# Patient Record
Sex: FEMALE | Race: BLACK OR AFRICAN AMERICAN | NOT HISPANIC OR LATINO | Employment: UNEMPLOYED | URBAN - METROPOLITAN AREA
[De-identification: names, ages, dates, MRNs, and addresses within clinical notes are randomized per-mention and may not be internally consistent; named-entity substitution may affect disease eponyms.]

---

## 2021-05-29 ENCOUNTER — RECORDS - HEALTHEAST (OUTPATIENT)
Dept: ADMINISTRATIVE | Facility: CLINIC | Age: 61
End: 2021-05-29

## 2022-05-11 ENCOUNTER — MEDICAL CORRESPONDENCE (OUTPATIENT)
Dept: HEALTH INFORMATION MANAGEMENT | Facility: CLINIC | Age: 62
End: 2022-05-11
Payer: COMMERCIAL

## 2022-05-16 ENCOUNTER — TRANSCRIBE ORDERS (OUTPATIENT)
Dept: OTHER | Age: 62
End: 2022-05-16
Payer: COMMERCIAL

## 2022-05-16 DIAGNOSIS — M87.9 BILATERAL HIP OSTEONECROSIS (H): Primary | ICD-10-CM

## 2022-05-19 NOTE — TELEPHONE ENCOUNTER
Action    Action Taken 5/19/2022 8:16AM CROW     I pulled Nahun[facility]ldon's Records into CE       DIAGNOSIS: bilateral hip   APPOINTMENT DATE: 6/6/2022   NOTES STATUS DETAILS   OFFICE NOTE from referring provider Care Everywhere Dr. Jacob Ocasio MD at [facility].   OFFICE NOTE from other specialist Care Everywhere    MEDICATION LIST Internal    LABS     CBC/DIFF Care Everywhere EPIC   MRI Care Everywhere MRI Pelvis 12/2/2021   CT SCAN Care Everywhere CT Angio Chest abdomen Pelvis 11/18/2021    More in PACS   XRAYS (IMAGES & REPORTS) Care Everywhere Xray Hip 5/11/2022    Xray Pelvis 4/27/2022    Xray Pelvis 1/13/2022    More in PACS   TUMOR

## 2022-06-06 ENCOUNTER — PRE VISIT (OUTPATIENT)
Dept: ORTHOPEDICS | Facility: CLINIC | Age: 62
End: 2022-06-06

## 2022-06-06 ENCOUNTER — ANCILLARY PROCEDURE (OUTPATIENT)
Dept: GENERAL RADIOLOGY | Facility: CLINIC | Age: 62
End: 2022-06-06
Attending: ORTHOPAEDIC SURGERY
Payer: COMMERCIAL

## 2022-06-06 ENCOUNTER — OFFICE VISIT (OUTPATIENT)
Dept: ORTHOPEDICS | Facility: CLINIC | Age: 62
End: 2022-06-06
Payer: COMMERCIAL

## 2022-06-06 VITALS — WEIGHT: 204 LBS | BODY MASS INDEX: 30.92 KG/M2 | HEIGHT: 68 IN

## 2022-06-06 DIAGNOSIS — M25.552 BILATERAL HIP PAIN: ICD-10-CM

## 2022-06-06 DIAGNOSIS — M25.552 BILATERAL HIP PAIN: Primary | ICD-10-CM

## 2022-06-06 DIAGNOSIS — M25.551 BILATERAL HIP PAIN: ICD-10-CM

## 2022-06-06 DIAGNOSIS — M87.9 BILATERAL HIP OSTEONECROSIS (H): ICD-10-CM

## 2022-06-06 DIAGNOSIS — M25.551 BILATERAL HIP PAIN: Primary | ICD-10-CM

## 2022-06-06 PROCEDURE — 99204 OFFICE O/P NEW MOD 45 MIN: CPT | Performed by: ORTHOPAEDIC SURGERY

## 2022-06-06 PROCEDURE — 73523 X-RAY EXAM HIPS BI 5/> VIEWS: CPT | Performed by: RADIOLOGY

## 2022-06-06 RX ORDER — DOXAZOSIN 2 MG/1
2 TABLET ORAL
COMMUNITY
End: 2022-09-22

## 2022-06-06 RX ORDER — SERTRALINE HYDROCHLORIDE 25 MG/1
25 TABLET, FILM COATED ORAL
COMMUNITY
End: 2022-09-22

## 2022-06-06 RX ORDER — PANTOPRAZOLE SODIUM 40 MG/1
40 TABLET, DELAYED RELEASE ORAL
COMMUNITY
Start: 2022-01-20 | End: 2022-09-22

## 2022-06-06 RX ORDER — FLUTICASONE PROPIONATE 50 MCG
2 SPRAY, SUSPENSION (ML) NASAL
COMMUNITY
Start: 2021-10-27 | End: 2023-02-02

## 2022-06-06 RX ORDER — NITROGLYCERIN 0.4 MG/1
0.4 TABLET SUBLINGUAL
COMMUNITY
Start: 2021-06-20

## 2022-06-06 RX ORDER — CEFUROXIME AXETIL 250 MG/1
250 TABLET ORAL
COMMUNITY
End: 2022-09-22

## 2022-06-06 RX ORDER — DILTIAZEM HYDROCHLORIDE 180 MG/1
360 CAPSULE, EXTENDED RELEASE ORAL
COMMUNITY
Start: 2022-01-20 | End: 2022-09-22 | Stop reason: ALTCHOICE

## 2022-06-06 RX ORDER — LORAZEPAM 0.5 MG/1
0.5 TABLET ORAL
COMMUNITY
End: 2022-09-22 | Stop reason: ALTCHOICE

## 2022-06-06 RX ORDER — BACLOFEN 10 MG/1
TABLET ORAL
COMMUNITY
Start: 2022-05-02 | End: 2022-09-22

## 2022-06-06 RX ORDER — ACETAMINOPHEN 500 MG
TABLET ORAL
COMMUNITY
Start: 2021-11-11 | End: 2022-11-11

## 2022-06-06 RX ORDER — BUPRENORPHINE 5 UG/H
1 PATCH TRANSDERMAL
COMMUNITY
End: 2022-09-22 | Stop reason: ALTCHOICE

## 2022-06-06 RX ORDER — ONDANSETRON 4 MG/1
4 TABLET, ORALLY DISINTEGRATING ORAL
COMMUNITY
End: 2023-02-02

## 2022-06-06 RX ORDER — AZITHROMYCIN 250 MG/1
250 TABLET, FILM COATED ORAL
COMMUNITY
End: 2022-09-22

## 2022-06-06 RX ORDER — OXYCODONE AND ACETAMINOPHEN 5; 325 MG/1; MG/1
TABLET ORAL
COMMUNITY
Start: 2022-05-19 | End: 2022-09-22 | Stop reason: ALTCHOICE

## 2022-06-06 RX ORDER — CLONIDINE HYDROCHLORIDE 0.3 MG/1
0.3 TABLET ORAL
COMMUNITY
Start: 2022-04-19 | End: 2022-09-22

## 2022-06-06 RX ORDER — PROCHLORPERAZINE MALEATE 10 MG
10 TABLET ORAL
COMMUNITY
Start: 2021-11-19 | End: 2023-02-02

## 2022-06-06 RX ORDER — CEPHALEXIN 500 MG/1
500 CAPSULE ORAL 2 TIMES DAILY
COMMUNITY
Start: 2021-07-22 | End: 2022-09-22

## 2022-06-06 RX ORDER — LACTULOSE 10 G/15ML
SOLUTION ORAL
COMMUNITY
Start: 2021-11-02 | End: 2022-09-22

## 2022-06-06 RX ORDER — TRAMADOL HYDROCHLORIDE 50 MG/1
50 TABLET ORAL
COMMUNITY
Start: 2022-02-17 | End: 2022-09-22 | Stop reason: ALTCHOICE

## 2022-06-06 RX ORDER — PREDNISONE 10 MG/1
10 TABLET ORAL
COMMUNITY
End: 2022-09-22

## 2022-06-06 RX ORDER — GABAPENTIN 100 MG/1
100 CAPSULE ORAL
COMMUNITY
Start: 2022-04-12 | End: 2022-09-22

## 2022-06-06 RX ORDER — PERMETHRIN 50 MG/G
CREAM TOPICAL
COMMUNITY
Start: 2021-07-22 | End: 2023-02-02

## 2022-06-06 ASSESSMENT — ACTIVITIES OF DAILY LIVING (ADL)
ADL_SUBSCALE_SCORE: 26.47
ADL_MEAN: 2.94
ADL_SUM: 50

## 2022-06-06 ASSESSMENT — HOOS S4: HOW SEVERE IS YOUR HIP JOINT STIFFNESS AFTER FIRST WAKENING IN THE MORNING?: SEVERE

## 2022-06-06 NOTE — NURSING NOTE
"Chief Complaint   Patient presents with     Consult     Right hip pain onset about a year and a half ago, said [facility]. 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  APPENDECTOMY   2.  6/24/2005 av fistula anastamosis Left   3. 11/7/2005 AV FISTULA PLACEMENT Right   thrombosed   4. 07/2019 dialysis fistula creation Left - [facility]   5.  INCISION AND DRAINAGE 06/2008 vulva or perineal abscess   6.  IRRIGATION AND DEBRIDEMENT 06/2008 ischiorectal and perirectal abscess   7. 07/10/2019 - LEFT ARM ARTERIAL VENOUS FISTULA FROM RADIAL ARTERY TO AXILLARY VEIN  8. 3/4/2020 - PLACEMENT OF RIGHT DIALYSIS ACCESS/ARTERIAL VENOUS FISTULA - Dr. Coon   9.  UNLISTED PROCEDURE SPINE 02/2006   10.  02/05/2020 - REVISION LEFT DIALYSIS ACCESS - ARTERIAL VENOUS FISTULA Left         Pain Assessment  Patient Currently in Pain: Yes  0-10 Pain Scale: 7 (patient states increased pain during dialysis)  Primary Pain Location: Hip           [id number] - [place], [place] - [address] AT [address]  [facility] (ESRD BUNDLE ONLY) - [address]         Allergies   Allergen Reactions     Nortriptyline Other (See Comments)     Psychosis  Psychosis       Beta Adrenergic Blockers Hives     Other reaction(s): Wheezing     Metoprolol Unknown     Morphine Hives and Nausea     Tolerates Percocet     Codeine Unknown           Current Outpatient Medications   Medication     acetaminophen (TYLENOL) 500 MG tablet     albuterol (PROVENTIL) 2.5 mg /3 mL (0.083 %) nebulizer solution     B complex with C#20-folic acid (NEPHROCAPS) 1 mg cap capsule     " calcium acetate (PHOSLO) 667 mg capsule     cephALEXin (KEFLEX) 500 MG capsule     cholecalciferol, vitamin D3, 1,000 unit tablet     cinacalcet (SENSIPAR) 90 MG tablet     cloNIDine (CATAPRES) 0.3 MG tablet     diltiazem (CARDIZEM CD) 360 MG 24 hr capsule     doxazosin (CARDURA) 2 MG tablet     fluticasone (FLONASE) 50 MCG/ACT nasal spray     gabapentin (NEURONTIN) 100 MG capsule     lactulose (CHRONULAC) 10 GM/15ML solution     LORazepam (ATIVAN) 0.5 MG tablet     losartan (COZAAR) 50 MG tablet     nitroGLYcerin (NITROSTAT) 0.4 MG sublingual tablet     ondansetron (ZOFRAN ODT) 4 MG ODT tab     oxyCODONE-acetaminophen (PERCOCET) 5-325 MG tablet     pantoprazole (PROTONIX) 40 MG EC tablet     permethrin (ELIMITE) 5 % external cream     predniSONE (DELTASONE) 10 MG tablet     prochlorperazine (COMPAZINE) 10 MG tablet     sertraline (ZOLOFT) 25 MG tablet     sevelamer (RENVELA) 2.4 gram PwPk powder     tiotropium (SPIRIVA) 18 mcg inhalation capsule     traMADol (ULTRAM) 50 MG tablet     acetaminophen (TYLENOL) 325 MG tablet     aspirin 81 MG EC tablet     azithromycin (ZITHROMAX) 250 MG tablet     baclofen (LIORESAL) 10 MG tablet     buprenorphine (BUTRANS) 5 MCG/HR WK patch     cefuroxime (CEFTIN) 250 MG tablet     cloNIDine HCl (CATAPRES) 0.3 MG tablet     diltiazem ER (DILT-XR) 180 MG 24 hr capsule     Dimethicone 1.5 % CREA     doxazosin (CARDURA) 2 MG tablet     fluticasone-salmeterol (ADVAIR) 100-50 mcg/dose DISKUS     LORazepam (ATIVAN) 0.5 MG tablet     minoxidil (LONITEN) 2.5 MG tablet     omeprazole (PRILOSEC) 20 MG capsule     polyethylene glycol (MIRALAX) 17 gram packet     No current facility-administered medications for this visit.             Questionnaires:    HOOS Hip Dysfunction & Osteoarthritis Outcome Questionnaire    No flowsheet data found.           KOOS Knee Survey Assessment    No flowsheet data found.           Promis 10 Assessment    No flowsheet data found.           Ortho [place] Knee  Questionnaire    No flowsheet data found.

## 2022-06-06 NOTE — LETTER
6/6/2022         RE: Rufina Urrutia  [address]  [place] [address]        Dear Colleague,    Thank you for referring your patient, Rufina Urrutia, to the [facility] [place]. Please see a copy of my visit note below.        [facility]  Orthopaedic Surgery, Joint Replacement Consultation  by Zac Salazar M.D.    Rufina Urrutia MRN# [id number]    YOB: 1960     Requesting physician: No ref. provider found  Lawrence Ortega  Nephrology  Jonny Erwin, [facility]  Benjie Garcia            Assessment and Plan:   Assessment:  1. Osteonecrosis bilateral femoral heads with remote history of ethanol abuse  2. Chronic renal disease on hemodialysis     Plan:  1. Obtain MRI examination of both hips or pelvis from [facility] as it was not available for review with me today.  Any prior MRI examinations of the abdomen pelvis would be useful for comparison and should be obtained as well.  2. As there is some concern regarding the etiology of her pain and whether or not it is related to her femoral head osteonecrosis, we will arrange for CT scan of both femoral heads to evaluate for possible occult subchondral fracture.  In the absence of such it is questionable whether or not the femoral head osteonecrosis is a source of her symptoms.  3. Return for follow-up examination of the above is completed.           History of Present Illness:   61 year old female  chief complaint    This patient is seen for third opinion in regards to her osteonecrosis of bilateral hips.  Patient states she has been seen at [facility] by Dr. Jonny Erwin and also Dr. Willie Ocasio at [facility] for evaluation of her hips.  She is having bilateral hip pain, greater on the right side.  She notes the pain mostly in the posterior lateral area of her buttock region.    She states that she was diagnosed with osteonecrosis.  Etiology is uncertain.  Patient states that she has  "a history of ethanol abuse over 30 years ago but has been sober for the last 30 years.  She acknowledges undergoing chemical dependency program for crack and narcotic usage but none for ethanol usage.    She states that she also has chronic renal disease and has been prescribed 1 week courses of prednisone on 2-3 occasions over   the last year.  No other steroid exposure systemically.  Patient has had numerous steroid injections around her hip.    Dr. Erwin did not prescribe any surgical intervention and Dr. Ocasio referred her for evaluation with me.    Of note is the patient's history of chronic renal disease.      Current symptoms:  Problem: Osteonecrosis of Bilateral Hips   Onset and duration: year and a half ago   Awakens from sleep due to sx's:  Yes  Precipitating Injury:  No    Other joints or sites painful:  Yes             Physical Exam:     EXAMINATION pertinent findings:   PSYCH: Pleasant, healthy-appearing, alert, oriented x3, cooperative. Normal mood and affect.  VITAL SIGNS: Height 1.721 m (5' 7.75\"), weight 92.5 kg (204 lb)..  Reviewed nursing intake notes.   Body mass index is 31.25 kg/m .  RESP: non labored breathing   ABD: benign, soft, non-tender, no acute peritoneal findings  SKIN: grossly normal   LYMPHATIC: grossly normal, no adenopathy, no extremity edema  NEURO: grossly normal , no motor deficits  VASCULAR: satisfactory perfusion of all extremities   MUSCULOSKELETAL:   Antalgic gait bilaterally.  Unable to stand erect.  Would not lie down supine on exam table for me today.  Could only examine her in a wheelchair.  Hip flexion to 90 degrees bilaterally possible.  Internal rotation of 20 external Tatian of 45 degrees bilaterally.  Full range of knee motion bilaterally.                 Data:   All laboratory data reviewed  All imaging studies reviewed by me    Plain radiographs are available for review.  Some density in both femoral head superiorly but no evidence of subchondral fracture is " identified.  Radiographs are difficult to evaluate due to soft tissue density overlying the femoral head.    MRI examination unavailable for review.      DATA for DOCUMENTATION:         Past Medical History:     Patient Active Problem List   Diagnosis     Encephalopathy acute     Acute encephalopathy     Anemia of renal disease     Arteriosclerosis of coronary artery     End stage kidney disease (H)     Accelerated hypertension     Asthma, moderate persistent     Extreme obesity     No past medical history on file.    Also see scanned health assessment forms.       Past Surgical History:   No past surgical history on file.         Social History:     Social History     Socioeconomic History     Marital status:      Spouse name: Not on file     Number of children: Not on file     Years of education: Not on file     Highest education level: Not on file   Occupational History     Not on file   Tobacco Use     Smoking status: Never Smoker     Smokeless tobacco: Not on file   Substance and Sexual Activity     Alcohol use: No     Drug use: Not on file     Sexual activity: Not on file   Other Topics Concern     Not on file   Social History Narrative    Lives with family.      Social Determinants of Health     Financial Resource Strain: Not on file   Food Insecurity: Not on file   Transportation Needs: Not on file   Physical Activity: Not on file   Stress: Not on file   Social Connections: Not on file   Intimate Partner Violence: Not on file   Housing Stability: Not on file            Family History:       Family History   Problem Relation Age of Onset     Hypertension Mother      Kidney Disease Mother      Cancer Brother      Breast Cancer Maternal Aunt             Medications:     Current Outpatient Medications   Medication Sig     acetaminophen (TYLENOL) 325 MG tablet [ACETAMINOPHEN (TYLENOL) 325 MG TABLET] Take 325-650 mg by mouth every 4 (four) hours as needed.      albuterol (PROVENTIL) 2.5 mg /3 mL (0.083 %)  nebulizer solution [ALBUTEROL (PROVENTIL) 2.5 MG /3 ML (0.083 %) NEBULIZER SOLUTION] Inhale 2.5 mg every 6 (six) hours as needed.      aspirin 81 MG EC tablet [ASPIRIN 81 MG EC TABLET] Take 81 mg by mouth daily.      B complex with C#20-folic acid (NEPHROCAPS) 1 mg cap capsule [B COMPLEX WITH C#20-FOLIC ACID (NEPHROCAPS) 1 MG CAP CAPSULE] TAKE 1 BY MOUTH DAILY     calcium acetate (PHOSLO) 667 mg capsule [CALCIUM ACETATE (PHOSLO) 667 MG CAPSULE] TAKE 2 BY MOUTH 3 TIMES    DAILY WITH MEALS           (GENERIC FOR PHOSLO)     cholecalciferol, vitamin D3, 1,000 unit tablet [CHOLECALCIFEROL, VITAMIN D3, 1,000 UNIT TABLET] Take 1,000 Units by mouth daily.     cinacalcet (SENSIPAR) 90 MG tablet [CINACALCET (SENSIPAR) 90 MG TABLET] Take 90 mg by mouth daily.     cloNIDine HCl (CATAPRES) 0.3 MG tablet [CLONIDINE HCL (CATAPRES) 0.3 MG TABLET] Take 0.3 mg by mouth 3 (three) times a day.      diltiazem (CARDIZEM CD) 360 MG 24 hr capsule [DILTIAZEM (CARDIZEM CD) 360 MG 24 HR CAPSULE] TAKE 1 BY MOUTH DAILY     doxazosin (CARDURA) 2 MG tablet [DOXAZOSIN (CARDURA) 2 MG TABLET] Take 2 mg by mouth 2 (two) times a day.      fluticasone-salmeterol (ADVAIR) 100-50 mcg/dose DISKUS [FLUTICASONE-SALMETEROL (ADVAIR) 100-50 MCG/DOSE DISKUS] Inhale 1 puff 2 (two) times a day.     LORazepam (ATIVAN) 0.5 MG tablet [LORAZEPAM (ATIVAN) 0.5 MG TABLET] Take 0.5 mg by mouth bedtime as needed for anxiety.     losartan (COZAAR) 50 MG tablet [LOSARTAN (COZAAR) 50 MG TABLET] Take 50 mg by mouth 2 (two) times a day.      minoxidil (LONITEN) 2.5 MG tablet [MINOXIDIL (LONITEN) 2.5 MG TABLET] Take 5 mg by mouth 2 (two) times a day.      omeprazole (PRILOSEC) 20 MG capsule [OMEPRAZOLE (PRILOSEC) 20 MG CAPSULE] Take 40 mg by mouth daily.      polyethylene glycol (MIRALAX) 17 gram packet [POLYETHYLENE GLYCOL (MIRALAX) 17 GRAM PACKET] Take 1 packet by mouth 2 (two) times a day.      sevelamer (RENVELA) 2.4 gram PwPk powder [SEVELAMER (RENVELA) 2.4 GRAM PWPK  POWDER] MIX AND TAKE 1 PACKET BY   MOUTH TWO TIMES DAILY WITH MEALS     tiotropium (SPIRIVA) 18 mcg inhalation capsule [TIOTROPIUM (SPIRIVA) 18 MCG INHALATION CAPSULE] Place 18 mcg into inhaler and inhale daily.      No current facility-administered medications for this visit.              Review of Systems:   A comprehensive 10 point review of systems (constitutional, ENT, cardiac, peripheral vascular, lymphatic, respiratory, GI, , Musculoskeletal, skin, Neurological) was performed and found to be negative except as described in this note.       HOOS Hip Dysfunction & Osteoarthritis Outcome Questionnaire    No flowsheet data found.           [unfilled]    KOOS Knee Survey Assessment    No flowsheet data found.     Promis 10 Assessment    No flowsheet data found.       Ortho [place] Knee Questionnaire    No flowsheet data found.       See intake form completed by patient

## 2022-06-06 NOTE — PROGRESS NOTES
[facility]  Orthopaedic Surgery, Joint Replacement Consultation  by Zac Salazar M.D.    Rufina Urrutia MRN# [id number]    YOB: 1960     Requesting physician: No ref. provider found  Lawrence Ortega.  Nephrology  Jonny Erwin, [facility]  Benjie Garcia            Assessment and Plan:   Assessment:  1. Osteonecrosis bilateral femoral heads with remote history of ethanol abuse  2. Chronic renal disease on hemodialysis     Plan:  1. Obtain MRI examination of both hips or pelvis from [facility] as it was not available for review with me today.  Any prior MRI examinations of the abdomen pelvis would be useful for comparison and should be obtained as well.  2. As there is some concern regarding the etiology of her pain and whether or not it is related to her femoral head osteonecrosis, we will arrange for CT scan of both femoral heads to evaluate for possible occult subchondral fracture.  In the absence of such it is questionable whether or not the femoral head osteonecrosis is a source of her symptoms.  3. Return for follow-up examination of the above is completed.           History of Present Illness:   61 year old female  chief complaint    This patient is seen for third opinion in regards to her osteonecrosis of bilateral hips.  Patient states she has been seen at [facility] by Dr. Jonny Erwin and also Dr. Willie Ocasio at [facility] for evaluation of her hips.  She is having bilateral hip pain, greater on the right side.  She notes the pain mostly in the posterior lateral area of her buttock region.    She states that she was diagnosed with osteonecrosis.  Etiology is uncertain.  Patient states that she has a history of ethanol abuse over 30 years ago but has been sober for the last 30 years.  She acknowledges undergoing chemical dependency program for crack and narcotic usage but none for ethanol usage.    She states that she also has chronic renal disease and has  "been prescribed 1 week courses of prednisone on 2-3 occasions over   the last year.  No other steroid exposure systemically.  Patient has had numerous steroid injections around her hip.    Dr. Erwin did not prescribe any surgical intervention and Dr. Ocasio referred her for evaluation with me.    Of note is the patient's history of chronic renal disease.      Current symptoms:  Problem: Osteonecrosis of Bilateral Hips   Onset and duration: year and a half ago   Awakens from sleep due to sx's:  Yes  Precipitating Injury:  No    Other joints or sites painful:  Yes             Physical Exam:     EXAMINATION pertinent findings:   PSYCH: Pleasant, healthy-appearing, alert, oriented x3, cooperative. Normal mood and affect.  VITAL SIGNS: Height 1.721 m (5' 7.75\"), weight 92.5 kg (204 lb)..  Reviewed nursing intake notes.   Body mass index is 31.25 kg/m .  RESP: non labored breathing   ABD: benign, soft, non-tender, no acute peritoneal findings  SKIN: grossly normal   LYMPHATIC: grossly normal, no adenopathy, no extremity edema  NEURO: grossly normal , no motor deficits  VASCULAR: satisfactory perfusion of all extremities   MUSCULOSKELETAL:   Antalgic gait bilaterally.  Unable to stand erect.  Would not lie down supine on exam table for me today.  Could only examine her in a wheelchair.  Hip flexion to 90 degrees bilaterally possible.  Internal rotation of 20 external Tatian of 45 degrees bilaterally.  Full range of knee motion bilaterally.                 Data:   All laboratory data reviewed  All imaging studies reviewed by me    Plain radiographs are available for review.  Some density in both femoral head superiorly but no evidence of subchondral fracture is identified.  Radiographs are difficult to evaluate due to soft tissue density overlying the femoral head.    MRI examination unavailable for review.      DATA for DOCUMENTATION:         Past Medical History:     Patient Active Problem List   Diagnosis     " Encephalopathy acute     Acute encephalopathy     Anemia of renal disease     Arteriosclerosis of coronary artery     End stage kidney disease (H)     Accelerated hypertension     Asthma, moderate persistent     Extreme obesity     No past medical history on file.    Also see scanned health assessment forms.       Past Surgical History:   No past surgical history on file.         Social History:     Social History     Socioeconomic History     Marital status:      Spouse name: Not on file     Number of children: Not on file     Years of education: Not on file     Highest education level: Not on file   Occupational History     Not on file   Tobacco Use     Smoking status: Never Smoker     Smokeless tobacco: Not on file   Substance and Sexual Activity     Alcohol use: No     Drug use: Not on file     Sexual activity: Not on file   Other Topics Concern     Not on file   Social History Narrative    Lives with family.      Social Determinants of Health     Financial Resource Strain: Not on file   Food Insecurity: Not on file   Transportation Needs: Not on file   Physical Activity: Not on file   Stress: Not on file   Social Connections: Not on file   Intimate Partner Violence: Not on file   Housing Stability: Not on file            Family History:       Family History   Problem Relation Age of Onset     Hypertension Mother      Kidney Disease Mother      Cancer Brother      Breast Cancer Maternal Aunt             Medications:     Current Outpatient Medications   Medication Sig     acetaminophen (TYLENOL) 325 MG tablet [ACETAMINOPHEN (TYLENOL) 325 MG TABLET] Take 325-650 mg by mouth every 4 (four) hours as needed.      albuterol (PROVENTIL) 2.5 mg /3 mL (0.083 %) nebulizer solution [ALBUTEROL (PROVENTIL) 2.5 MG /3 ML (0.083 %) NEBULIZER SOLUTION] Inhale 2.5 mg every 6 (six) hours as needed.      aspirin 81 MG EC tablet [ASPIRIN 81 MG EC TABLET] Take 81 mg by mouth daily.      B complex with C#20-folic acid  (NEPHROCAPS) 1 mg cap capsule [B COMPLEX WITH C#20-FOLIC ACID (NEPHROCAPS) 1 MG CAP CAPSULE] TAKE 1 BY MOUTH DAILY     calcium acetate (PHOSLO) 667 mg capsule [CALCIUM ACETATE (PHOSLO) 667 MG CAPSULE] TAKE 2 BY MOUTH 3 TIMES    DAILY WITH MEALS           (GENERIC FOR PHOSLO)     cholecalciferol, vitamin D3, 1,000 unit tablet [CHOLECALCIFEROL, VITAMIN D3, 1,000 UNIT TABLET] Take 1,000 Units by mouth daily.     cinacalcet (SENSIPAR) 90 MG tablet [CINACALCET (SENSIPAR) 90 MG TABLET] Take 90 mg by mouth daily.     cloNIDine HCl (CATAPRES) 0.3 MG tablet [CLONIDINE HCL (CATAPRES) 0.3 MG TABLET] Take 0.3 mg by mouth 3 (three) times a day.      diltiazem (CARDIZEM CD) 360 MG 24 hr capsule [DILTIAZEM (CARDIZEM CD) 360 MG 24 HR CAPSULE] TAKE 1 BY MOUTH DAILY     doxazosin (CARDURA) 2 MG tablet [DOXAZOSIN (CARDURA) 2 MG TABLET] Take 2 mg by mouth 2 (two) times a day.      fluticasone-salmeterol (ADVAIR) 100-50 mcg/dose DISKUS [FLUTICASONE-SALMETEROL (ADVAIR) 100-50 MCG/DOSE DISKUS] Inhale 1 puff 2 (two) times a day.     LORazepam (ATIVAN) 0.5 MG tablet [LORAZEPAM (ATIVAN) 0.5 MG TABLET] Take 0.5 mg by mouth bedtime as needed for anxiety.     losartan (COZAAR) 50 MG tablet [LOSARTAN (COZAAR) 50 MG TABLET] Take 50 mg by mouth 2 (two) times a day.      minoxidil (LONITEN) 2.5 MG tablet [MINOXIDIL (LONITEN) 2.5 MG TABLET] Take 5 mg by mouth 2 (two) times a day.      omeprazole (PRILOSEC) 20 MG capsule [OMEPRAZOLE (PRILOSEC) 20 MG CAPSULE] Take 40 mg by mouth daily.      polyethylene glycol (MIRALAX) 17 gram packet [POLYETHYLENE GLYCOL (MIRALAX) 17 GRAM PACKET] Take 1 packet by mouth 2 (two) times a day.      sevelamer (RENVELA) 2.4 gram PwPk powder [SEVELAMER (RENVELA) 2.4 GRAM PWPK POWDER] MIX AND TAKE 1 PACKET BY   MOUTH TWO TIMES DAILY WITH MEALS     tiotropium (SPIRIVA) 18 mcg inhalation capsule [TIOTROPIUM (SPIRIVA) 18 MCG INHALATION CAPSULE] Place 18 mcg into inhaler and inhale daily.      No current facility-administered  medications for this visit.              Review of Systems:   A comprehensive 10 point review of systems (constitutional, ENT, cardiac, peripheral vascular, lymphatic, respiratory, GI, , Musculoskeletal, skin, Neurological) was performed and found to be negative except as described in this note.       HOOS Hip Dysfunction & Osteoarthritis Outcome Questionnaire    No flowsheet data found.           [unfilled]    KOOS Knee Survey Assessment    No flowsheet data found.           Promis 10 Assessment    No flowsheet data found.           Ortho [place] Knee Questionnaire    No flowsheet data found.             See intake form completed by patient

## 2022-06-20 ENCOUNTER — ANCILLARY PROCEDURE (OUTPATIENT)
Dept: CT IMAGING | Facility: CLINIC | Age: 62
End: 2022-06-20
Attending: ORTHOPAEDIC SURGERY
Payer: COMMERCIAL

## 2022-06-20 DIAGNOSIS — M87.9 BILATERAL HIP OSTEONECROSIS (H): ICD-10-CM

## 2022-06-20 LAB
CREAT BLD-MCNC: 8.8 MG/DL (ref 0.5–1)
GFR SERPL CREATININE-BSD FRML MDRD: 5 ML/MIN/1.73M2

## 2022-06-20 PROCEDURE — 82565 ASSAY OF CREATININE: CPT | Performed by: PATHOLOGY

## 2022-06-20 PROCEDURE — 73701 CT LOWER EXTREMITY W/DYE: CPT | Mod: LT | Performed by: RADIOLOGY

## 2022-06-20 RX ADMIN — IOPAMIDOL 126 ML: 755 INJECTION, SOLUTION INTRAVASCULAR at 16:38

## 2022-06-30 ENCOUNTER — OFFICE VISIT (OUTPATIENT)
Dept: ORTHOPEDICS | Facility: CLINIC | Age: 62
End: 2022-06-30
Payer: COMMERCIAL

## 2022-06-30 DIAGNOSIS — M87.9 BILATERAL HIP OSTEONECROSIS (H): Primary | ICD-10-CM

## 2022-06-30 PROCEDURE — 99214 OFFICE O/P EST MOD 30 MIN: CPT | Performed by: ORTHOPAEDIC SURGERY

## 2022-06-30 NOTE — PROGRESS NOTES
[facility]  Orthopaedic Surgery, Joint Replacement Consultation  by Zac Salazar M.D.    Rufina Urrutia MRN# [id number]    YOB: 1960     Requesting physician: No ref. provider found  Lawrence Ortega  Nephrology  [facility]  Benjie Garcia            Assessment and Plan:   Assessment:  1. 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 R femoral head necrotic arc coronal 56 deg, sag 115 deg  2. L femoral head necrotic arc coronal 56 deg, sag 106 deg  2. Chronic renal disease on hemodialysis    Plan:  1. I explained to patient that there is no evidence of subchondral collapse or arthritis and I see no indication for performing hip replacement surgery.  However, she has greater than 30% involvement of the femoral head bilaterally and no evidence of collapse or subchondral fracture.  In this situation she is at high risk for eventual subchondral fracture in an effort to prevent this I have discussed with her the possibly performing core decompression augmented by bone marrow aspirate derived concentrated cellular grafting.  We discussed the risk benefits and alternatives to the procedure.  She understands the indication for the procedure is to prevent subchondral collapse and not necessarily to leave pain.  Furthermore the procedure is only helpful in approximately 50% of cases.  Patient has indicated a desire to proceed with above-mentioned surgery.  We will plan on doing so on a bilateral femoral head basis.  2. I will asked our nurse Brooks,  to arrange for the following proposed procedure: Bilateral femoral head core decompression with bone marrow aspirate concentrate grafting, 1 hour, same-day surgery, tier 3.             History of Present Illness:   61 year old female  chief complaint    This patient returns for review of the MRI examination which was not available at her prior visit.  Furthermore we  obtained CT scan to look for evidence of a subchondral fracture that might explain her pain.  I explained to the patient that the imaging studies do not show evidence of subchondral fracture and I am uncertain of the etiology of the pain that she is describing.  I remain concerned that the source of her pain is not from her hips.    Background history:  This patient is seen for third opinion in regards to her osteonecrosis of bilateral hips.  Patient states she has been seen at [facility] by Dr. Jonny Erwin and also Dr. Willie Ocasio at [facility] for evaluation of her hips.  She is having bilateral hip pain, greater on the right side.  She notes the pain mostly in the posterior lateral area of her buttock region.    She states that she was diagnosed with osteonecrosis.  Etiology is uncertain.  Patient states that she has a history of ethanol abuse over 30 years ago but has been sober for the last 30 years.  She acknowledges undergoing chemical dependency program for crack and narcotic usage but none for ethanol usage.    She states that she also has chronic renal disease and has been prescribed 1 week courses of prednisone on 2-3 occasions over   the last year.  No other steroid exposure systemically.  Patient has had numerous steroid injections around her hip.    Dr. Erwin did not prescribe any surgical intervention and Dr. Ocasio referred her for evaluation with me.    Of note is the patient's history of chronic renal disease.      Current symptoms:  Problem: Osteonecrosis of Bilateral Hips   Onset and duration: year and a half ago   Awakens from sleep due to sx's:  Yes  Precipitating Injury:  No    Other joints or sites painful:  Yes      MD Xiomara Vines Family Professor  Oncology and Adult Reconstructive Surgery  Dept Orthopaedic Surgery, [facility]  [phone] office, [phone] pager  [email or web address]    Total combined visit time and work time before and after clinic visit = 20  min

## 2022-07-01 ENCOUNTER — TELEPHONE (OUTPATIENT)
Dept: ORTHOPEDICS | Facility: CLINIC | Age: 62
End: 2022-07-01

## 2022-07-01 ENCOUNTER — PREP FOR PROCEDURE (OUTPATIENT)
Dept: ORTHOPEDICS | Facility: CLINIC | Age: 62
End: 2022-07-01

## 2022-07-01 DIAGNOSIS — M87.9 BILATERAL HIP OSTEONECROSIS (H): Primary | ICD-10-CM

## 2022-07-01 NOTE — TELEPHONE ENCOUNTER
-Spoke to the patient over the phone and performed pre-op teaching.    Teaching Flowsheet   Relevant Diagnosis: Pre-Op Teaching   Teaching Topic: Bilateral femoral head core decompression with bone marrow aspirate concentrate grafting, 1 hour, same-day surgery, tier 3.       Person(s) involved in teaching:   Patient     Motivation Level:  Asks Questions: Yes  Eager to Learn: Yes  Cooperative: Yes  Receptive (willing/able to accept information): Yes  Any cultural factors/[place]ian beliefs that may influence understanding or compliance? No  Comments:      Patient demonstrates understanding of the following:  Reason for the appointment, diagnosis and treatment plan: Yes  Knowledge of proper use of medications and conditions for which they are ordered (with special attention to potential side effects or drug interactions): Yes  Which situations necessitate calling provider and whom to contact: Yes  What has the patient tried?    Has your symptoms improved?       Teaching Concerns Addressed:   Comments:      Proper use and care of surgical scrub.  Patient to purchase this OTC from her local pharmacy.  (medical equip, care aids, etc.): Yes  Nutritional needs and diet plan: Yes  Pain management techniques: Yes  Wound Care: Yes  How and/when to access community resources: Yes     Instructional Materials Used/Given: Marcie to mail a surgery packet to the patient.  The patient states that she is currently staying at her daughter's house, so she would like the information mailed there.  The address is: [address] [place], [place] [id number]    In addition to this information, we also discussed:    -important phone numbers/ contact info  -map/ location of surgery  -medications to avoid  -showering instructions  -stop light tool  -pre op with PCP needed with 30 days of surgery  -Covid test needed 1-2 days before surgery (ok to take at home and photograph the negative results).  -patient lists her daughter, Ruby  as the person to be driving her home and staying with her after surgery.    Christie Zaidi RN  7/1/2022 2:46 PM       Time spent with patient: 15 minutes.

## 2022-07-05 ENCOUNTER — TELEPHONE (OUTPATIENT)
Dept: ORTHOPEDICS | Facility: CLINIC | Age: 62
End: 2022-07-05

## 2022-07-05 NOTE — TELEPHONE ENCOUNTER
Phoned patient to schedule surgery with Dr Salazar. I left her my direct number to call back at her convenience. [phone]

## 2022-07-19 NOTE — TELEPHONE ENCOUNTER
Phoned patient a second time to schedule surgery with Dr Salazar. I left her my direct number to call when she is ready to schedule. [phone]

## 2022-08-09 ENCOUNTER — HOSPITAL ENCOUNTER (OUTPATIENT)
Facility: CLINIC | Age: 62
End: 2022-08-09
Attending: ORTHOPAEDIC SURGERY | Admitting: ORTHOPAEDIC SURGERY
Payer: COMMERCIAL

## 2022-08-09 ENCOUNTER — TELEPHONE (OUTPATIENT)
Dept: ORTHOPEDICS | Facility: CLINIC | Age: 62
End: 2022-08-09

## 2022-08-09 NOTE — TELEPHONE ENCOUNTER
-Called and left the patient a voicemail message.    -Told the patient I was just calling to check in on her, see how she was feeling & if she had any further questions before surgery.      -Patient was encouraged to call Marcie  directly if wanting to schedule surgery or she could call me if she had any further questions about surgery as well.    -Will await a call back.    -Otherwise, Marcie will update writer on surgery scheduling details as they get finalized.    Christie Zaidi RN  8/9/2022 4:14 PM

## 2022-09-05 NOTE — PROGRESS NOTES
SURGERY PLAN (PRE-OP PLAN)     Patient Position (indicated by x):  X  Supine   x   Dylon x-ray table        x  Split drape with top bar (blue towel between legs with ioban, prep bilaterally simultaneously)   x   Blue U drape x2   Blue and white stockinet   Coban   Ioban      General Equipment Requests (indicated by x):    x  C-Arm      No need for C-Armor drape   x  Synthes cannulated 5.0 mm screw set (drill tipped guide wire)   x  [place] BMAC kit x 2   x  long bone marrow (green handle) bone marrow biopsy cannula       Specimens and cultures (indicated by x):   x  will send 2 cc of each aspirate for CFU and cell counts      Frozen section      pathology specimens - fresh      pathology specimens - formalin     DX:  62F with B/L femoral head AVN. Hx of alcohol abuse. Has CKD  Plan:  B/L Femoral Head Core Decompression    Sol Riggs MD  Adult Reconstructive Surgery Fellow  Department of Orthopaedic Surgery, [facility]

## 2022-09-08 RX ORDER — CEFAZOLIN SODIUM/WATER 2 G/20 ML
2 SYRINGE (ML) INTRAVENOUS SEE ADMIN INSTRUCTIONS
Status: CANCELLED | OUTPATIENT
Start: 2022-09-08

## 2022-09-08 RX ORDER — CEFAZOLIN SODIUM/WATER 2 G/20 ML
2 SYRINGE (ML) INTRAVENOUS
Status: CANCELLED | OUTPATIENT
Start: 2022-09-08

## 2022-09-22 ENCOUNTER — LAB (OUTPATIENT)
Dept: LAB | Facility: CLINIC | Age: 62
End: 2022-09-22
Payer: COMMERCIAL

## 2022-09-22 ENCOUNTER — OFFICE VISIT (OUTPATIENT)
Dept: INTERNAL MEDICINE | Facility: CLINIC | Age: 62
End: 2022-09-22

## 2022-09-22 VITALS
DIASTOLIC BLOOD PRESSURE: 85 MMHG | WEIGHT: 196 LBS | SYSTOLIC BLOOD PRESSURE: 150 MMHG | HEIGHT: 68 IN | BODY MASS INDEX: 29.7 KG/M2 | OXYGEN SATURATION: 100 % | HEART RATE: 51 BPM

## 2022-09-22 DIAGNOSIS — N18.6 END STAGE KIDNEY DISEASE (H): ICD-10-CM

## 2022-09-22 DIAGNOSIS — Z01.818 PREOP GENERAL PHYSICAL EXAM: ICD-10-CM

## 2022-09-22 DIAGNOSIS — K59.00 CONSTIPATION, UNSPECIFIED CONSTIPATION TYPE: ICD-10-CM

## 2022-09-22 DIAGNOSIS — Z01.818 PREOP GENERAL PHYSICAL EXAM: Primary | ICD-10-CM

## 2022-09-22 DIAGNOSIS — M87.9 OSTEONECROSIS (H): ICD-10-CM

## 2022-09-22 DIAGNOSIS — F32.1 CURRENT MODERATE EPISODE OF MAJOR DEPRESSIVE DISORDER, UNSPECIFIED WHETHER RECURRENT (H): ICD-10-CM

## 2022-09-22 DIAGNOSIS — J41.0 SIMPLE CHRONIC BRONCHITIS (H): ICD-10-CM

## 2022-09-22 DIAGNOSIS — I10 PRIMARY HYPERTENSION: ICD-10-CM

## 2022-09-22 LAB
ANION GAP SERPL CALCULATED.3IONS-SCNC: 18 MMOL/L (ref 7–15)
ATRIAL RATE - MUSE: 49 BPM
BUN SERPL-MCNC: 49.5 MG/DL (ref 8–23)
CALCIUM SERPL-MCNC: 9.8 MG/DL (ref 8.8–10.2)
CHLORIDE SERPL-SCNC: 95 MMOL/L (ref 98–107)
CREAT SERPL-MCNC: 9.7 MG/DL (ref 0.51–0.95)
DEPRECATED HCO3 PLAS-SCNC: 25 MMOL/L (ref 22–29)
DIASTOLIC BLOOD PRESSURE - MUSE: NORMAL MMHG
ERYTHROCYTE [DISTWIDTH] IN BLOOD BY AUTOMATED COUNT: 13.1 % (ref 10–15)
GFR SERPL CREATININE-BSD FRML MDRD: 4 ML/MIN/1.73M2
GLUCOSE SERPL-MCNC: 116 MG/DL (ref 70–99)
HCT VFR BLD AUTO: 32.5 % (ref 35–47)
HGB BLD-MCNC: 10.4 G/DL (ref 11.7–15.7)
INTERPRETATION ECG - MUSE: NORMAL
MCH RBC QN AUTO: 32.2 PG (ref 26.5–33)
MCHC RBC AUTO-ENTMCNC: 32 G/DL (ref 31.5–36.5)
MCV RBC AUTO: 101 FL (ref 78–100)
P AXIS - MUSE: 55 DEGREES
PLATELET # BLD AUTO: 187 10E3/UL (ref 150–450)
POTASSIUM SERPL-SCNC: 5.3 MMOL/L (ref 3.4–5.3)
PR INTERVAL - MUSE: 160 MS
QRS DURATION - MUSE: 72 MS
QT - MUSE: 540 MS
QTC - MUSE: 487 MS
R AXIS - MUSE: 30 DEGREES
RBC # BLD AUTO: 3.23 10E6/UL (ref 3.8–5.2)
SODIUM SERPL-SCNC: 138 MMOL/L (ref 136–145)
SYSTOLIC BLOOD PRESSURE - MUSE: NORMAL MMHG
T AXIS - MUSE: 64 DEGREES
VENTRICULAR RATE- MUSE: 49 BPM
WBC # BLD AUTO: 9.1 10E3/UL (ref 4–11)

## 2022-09-22 PROCEDURE — 80048 BASIC METABOLIC PNL TOTAL CA: CPT | Performed by: PATHOLOGY

## 2022-09-22 PROCEDURE — 93000 ELECTROCARDIOGRAM COMPLETE: CPT | Performed by: HOSPITALIST

## 2022-09-22 PROCEDURE — 99386 PREV VISIT NEW AGE 40-64: CPT | Mod: 25 | Performed by: HOSPITALIST

## 2022-09-22 PROCEDURE — 85027 COMPLETE CBC AUTOMATED: CPT | Performed by: PATHOLOGY

## 2022-09-22 PROCEDURE — 36415 COLL VENOUS BLD VENIPUNCTURE: CPT | Performed by: PATHOLOGY

## 2022-09-22 RX ORDER — CLONIDINE HYDROCHLORIDE 0.3 MG/1
0.3 TABLET ORAL 3 TIMES DAILY
COMMUNITY
Start: 2022-09-22

## 2022-09-22 RX ORDER — SERTRALINE HYDROCHLORIDE 25 MG/1
25 TABLET, FILM COATED ORAL DAILY
Qty: 90 TABLET | Refills: 3 | Status: SHIPPED | OUTPATIENT
Start: 2022-09-22

## 2022-09-22 RX ORDER — TIOTROPIUM BROMIDE 18 UG/1
18 CAPSULE ORAL; RESPIRATORY (INHALATION) DAILY
Qty: 90 CAPSULE | Refills: 3 | Status: SHIPPED | OUTPATIENT
Start: 2022-09-22

## 2022-09-22 RX ORDER — OXYCODONE HYDROCHLORIDE 5 MG/1
5 TABLET ORAL 2 TIMES DAILY PRN
Refills: 0 | COMMUNITY
Start: 2022-09-22 | End: 2023-02-02

## 2022-09-22 RX ORDER — DOXAZOSIN 2 MG/1
2 TABLET ORAL DAILY
COMMUNITY
Start: 2022-09-22 | End: 2023-02-02

## 2022-09-22 RX ORDER — LOSARTAN POTASSIUM 50 MG/1
50 TABLET ORAL DAILY
COMMUNITY
Start: 2022-09-22 | End: 2023-02-02

## 2022-09-22 RX ORDER — GABAPENTIN 100 MG/1
100 CAPSULE ORAL 3 TIMES DAILY
COMMUNITY
Start: 2022-09-22

## 2022-09-22 RX ORDER — POLYETHYLENE GLYCOL 3350 17 G/17G
17 POWDER, FOR SOLUTION ORAL DAILY PRN
Qty: 510 G | COMMUNITY
Start: 2022-09-22

## 2022-09-22 ASSESSMENT — ASTHMA QUESTIONNAIRES: ACT_TOTALSCORE: 25

## 2022-09-22 NOTE — NURSING NOTE
Rufina Urrutia is a 62 year old female patient that presents today in clinic for the following:    Chief Complaint   Patient presents with     Physical     Pt here for physical; missed surgery on 9/13/2022     The patient's allergies and medications were reviewed as noted. A set of vitals were recorded as noted without incident. The patient does not have any other questions for the provider.    Mayela Mcfarland, EMT at 9:09 AM on 9/22/2022

## 2022-09-22 NOTE — PATIENT INSTRUCTIONS
- Patient approved for procedure.    - Patient to not take Aspirin 7 days before procedure. No Ibuprofen, Naproxen before procedure.   - No alcohol 7 days before procedure.   - May continue current medications.   - Labs for CBC and BMP.   - EKG today.   - Date not determined for procedure.   - Eventually needs note faxed for Preop purposes to Dr. Salazar at [phone] or [phone].    Follow up again in 1 month (1 hour visit).

## 2022-09-23 NOTE — ASSESSMENT & PLAN NOTE
Patient does smoke 2-3 cigarettes a day. Encourage to quit.   Continued on Advair and Spiriva. Encouraged daily use of Spiriva. Albuterol prn.

## 2022-09-23 NOTE — ASSESSMENT & PLAN NOTE
Patient approved for procedure. RCRI 2 of 6. METs less than 4, limited by bilateral hip pains. EKG does show slightly prolonged QT with QTc at 487. Mild anemia on labs today with Hb at 10.4 for baseline.    - Patient to not take Aspirin 7 days before procedure. No Ibuprofen, Naproxen before procedure.   - No alcohol 7 days before procedure. Has been sober for about 40 years she mentions.   - May continue current medications.   - Labs for CBC and BMP.   - EKG today.   - Date not determined for procedure.   - Eventually needs note faxed for Preop purposes to Dr. Salazar at [phone] or [phone].    Follow up again in 1 month (1 hour visit).

## 2022-09-23 NOTE — PROGRESS NOTES
[facility] INTERNAL MEDICINE [address]  [address] [facility] [address]  Phone: [phone]  Fax: [phone]  Primary Provider: Constantine Ponce  Pre-op Performing Provider: CONSTANTINE PONCE      PREOPERATIVE EVALUATION:  Today's date: 9/22/2022    Rufina Urrutia is a 62 year old female who presents for a preoperative evaluation.    Surgical Information:  Surgery/Procedure: Bilateral femoral head core decompression with bone marrow aspirate concentrate grafting  Surgery Location: TBD  Surgeon: Dr. Zac Salazar  Surgery Date: TBD  Time of Surgery: TBD  Where patient plans to recover: Other: TBD  Fax number for surgical facility: Fax to Dr. Salazar at [phone] or [phone]    Type of Anesthesia Anticipated: to be determined    Assessment & Plan     The proposed surgical procedure is considered INTERMEDIATE risk.    Problem List Items Addressed This Visit        Respiratory    Simple chronic bronchitis (H)     Patient does smoke 2-3 cigarettes a day. Encourage to quit.   Continued on Advair and Spiriva. Encouraged daily use of Spiriva. Albuterol prn.            Relevant Medications    tiotropium (SPIRIVA) 18 MCG inhaled capsule       Digestive    Constipation, unspecified constipation type     Patient does use Oxycodone and Gabapentin, constipation likely related to medications.   Continued on miralax as needed.            Relevant Medications    polyethylene glycol (MIRALAX) 17 GM/Dose powder       Circulatory    Primary hypertension     Continued on losartan, doxazosin, and diltiazem. On dialysis Tuesday, Thursday, and Saturday.            Relevant Medications    cloNIDine (CATAPRES) 0.3 MG tablet    doxazosin (CARDURA) 2 MG tablet    losartan (COZAAR) 50 MG tablet       Musculoskeletal and Integumentary    Osteonecrosis (H)    Relevant Medications    aspirin (ASA) 81 MG EC tablet    oxyCODONE (ROXICODONE) 5 MG tablet       Urinary    End stage  kidney disease (H)       Behavioral    Current moderate episode of major depressive disorder, unspecified whether recurrent (H)     Recommended use of Sertraline 25mg daily on a regular bases           Relevant Medications    sertraline (ZOLOFT) 25 MG tablet       Other    Preop general physical exam - Primary     Patient approved for procedure. RCRI 2 of 6. METs less than 4, limited by bilateral hip pains. EKG does show slightly prolonged QT with QTc at 487. Mild anemia on labs today with Hb at 10.4 for baseline.    - Patient to not take Aspirin 7 days before procedure. No Ibuprofen, Naproxen before procedure.   - No alcohol 7 days before procedure. Has been sober for about 40 years she mentions.   - May continue current medications.   - Labs for CBC and BMP.   - EKG today.   - Date not determined for procedure.   - Eventually needs note faxed for Preop purposes to Dr. Salazar at [phone] or [phone].    Follow up again in 1 month (1 hour visit).            Relevant Orders    Basic metabolic panel (Completed)    CBC with platelets (Completed)    EKG 12-lead complete w/read - Clinics (Completed)            RECOMMENDATION:  APPROVAL GIVEN to proceed with proposed procedure, without further diagnostic evaluation.    Review of external notes as documented above     50 minutes spent on the date of the encounter doing chart review, history and exam, documentation and further activities per the note        Subjective     HPI related to upcoming procedure: Patient is unclear at first the reason for visit. She mentions she needs to establish care, needs a preop exam and needs a physical. Discussed that we should focus on preop exam for possible procedure. She mentions she was to have a procedure for her hips for stem cells with Dr. Salazar on 9/13 but was cancelled because she didn't know she needed a preop exam. She had chronic hip pains and hx of avascular osteonecrosis of both hips. She does hope this will help with  her pains. Discussed with patient that procedure is likely not for pain relief but protection of her bones. She is on dialysis Tuesday, Thursday and Friday. She brings with her 4 lab results from dialysis done on 9/1 showing Albumin of 4.7, Corrected Calcium low at 6.3, Phos elevated to 6.0 and Potassium of 4.0. She mentions she has had fistulas of both arms but both had failed. She is currently with a HD tunneled catheter of her left upper chest it failed on her right. The left tunnelled catheter has been there for 8-9 years now, had been changed once over the years. She does mentions she takes oxycodone for hip pains and takes miralax as needed for constipation, no regularly.     We reviewed list of medications which initially had duplications, missing dosages listed and medications she was no longer taking. For instance, percocet, butrans were listed which she is not on. She mentions she was placed on methadone but did not work for her due to nausea. She also was on prednisone for pains in the past but is no longer on them. Mentions having some depression and takes sertraline as needed. She also mentions she uses Spiriva as needed as well.     She does mention have narrow arteries of her heart, no history of heart attacks or heart stents. She denies any chest pains or shortness of breath recently. Mentions she has swelling sometimes in her legs but not today. No bleeding concerns. Mentions she has not drank alcohol for about 40 years. She used to take multiple substances in her 20s but not currently.       Health Care Directive:  Patient does not have a Health Care Directive or Living Will: Patient will be Full code for procedure    Preoperative Review of :   reviewed - controlled substances reflected in medication list.      Status of Chronic Conditions:  ESRD on Hemodialysis, failed fistulas of both arms, CAD without prior cardiac stenting, Asthma, COPD with hx of smoking, HTN, Obesity, Osteonecrosis of  both hips with chronic hip and back pain    Review of Systems  CONSTITUTIONAL: NEGATIVE for fever, chills, change in weight  INTEGUMENTARY/SKIN: NEGATIVE for worrisome rashes, moles or lesions  EYES: NEGATIVE for vision changes or irritation  ENT/MOUTH: NEGATIVE for ear, mouth and throat problems  RESP: NEGATIVE for significant cough or SOB  CV: NEGATIVE for chest pain, palpitations or peripheral edema  GI: NEGATIVE for nausea, abdominal pain, heartburn, or change in bowel habits   female: no dysuria  MUSCULOSKELETAL:pains in both hips  NEURO: NEGATIVE for weakness, dizziness or paresthesias  ENDOCRINE: NEGATIVE for temperature intolerance, skin/hair changes  HEME: NEGATIVE for bleeding problems  PSYCHIATRIC: some depression    Patient Active Problem List    Diagnosis Date Noted     Acute encephalopathy 10/23/2015     Priority: Medium     Asthma, moderate persistent 10/23/2015     Priority: Medium     Overview:   Tobacco use         Encephalopathy acute 10/22/2015     Priority: Medium     Arteriosclerosis of coronary artery 03/30/2011     Priority: Medium     Overview:   Coronary angiography 11/2010: mild disease only in distal LAD, chronic   troponin elevation >0.047 due to ESRD         Accelerated hypertension 09/14/2010     Priority: Medium     Extreme obesity 07/05/2010     Priority: Medium     End stage kidney disease (H) 08/28/2007     Priority: Medium     Overview:   Nephrotic range proteinuria--no tissue diagnosis. HD started via RIJ CVC.   Failed left arm AV access         Anemia of renal disease 06/21/2005     Priority: Medium      Past Medical History:   Diagnosis Date     Anemia      COPD (chronic obstructive pulmonary disease) (H)      Coronary artery disease involving native coronary artery     No history of stent     ESRD (end stage renal disease) on dialysis (H)      Mild intermittent asthma      Obesity      Osteonecrosis (H)     unclear ideology     Primary hypertension      Past Surgical  History:   Procedure Laterality Date     AV FISTULA OR GRAFT ARTERIAL      Bilateral, both failed     Current Outpatient Medications   Medication Sig Dispense Refill     acetaminophen (TYLENOL) 500 MG tablet TAKE 2 TABLETS BY MOUTH THREE TIMES A DAY. MAXIMUM ACETAMINOPHEN DOSE IS 4000 MG IN 24 HOURS INDICATIONS: PAIN       albuterol (PROVENTIL) 2.5 mg /3 mL (0.083 %) nebulizer solution [ALBUTEROL (PROVENTIL) 2.5 MG /3 ML (0.083 %) NEBULIZER SOLUTION] Inhale 2.5 mg every 6 (six) hours as needed.        aspirin (ASA) 81 MG EC tablet Take 1 tablet (81 mg) by mouth daily       B complex with C#20-folic acid (NEPHROCAPS) 1 mg cap capsule [B COMPLEX WITH C#20-FOLIC ACID (NEPHROCAPS) 1 MG CAP CAPSULE] TAKE 1 BY MOUTH DAILY       calcium acetate (PHOSLO) 667 mg capsule [CALCIUM ACETATE (PHOSLO) 667 MG CAPSULE] TAKE 2 BY MOUTH 3 TIMES    DAILY WITH MEALS           (GENERIC FOR PHOSLO)       cholecalciferol, vitamin D3, 1,000 unit tablet [CHOLECALCIFEROL, VITAMIN D3, 1,000 UNIT TABLET] Take 1,000 Units by mouth daily.       cinacalcet (SENSIPAR) 90 MG tablet [CINACALCET (SENSIPAR) 90 MG TABLET] Take 90 mg by mouth daily.       cloNIDine (CATAPRES) 0.3 MG tablet Take 1 tablet (0.3 mg) by mouth 3 times daily       diltiazem (CARDIZEM CD) 360 MG 24 hr capsule [DILTIAZEM (CARDIZEM CD) 360 MG 24 HR CAPSULE] TAKE 1 BY MOUTH DAILY       doxazosin (CARDURA) 2 MG tablet Take 1 tablet (2 mg) by mouth daily       fluticasone (FLONASE) 50 MCG/ACT nasal spray 2 sprays       fluticasone-salmeterol (ADVAIR) 100-50 mcg/dose DISKUS Inhale 1 puff into the lungs 2 times daily       gabapentin (NEURONTIN) 100 MG capsule Take 1 capsule (100 mg) by mouth 2 times daily       LORazepam (ATIVAN) 0.5 MG tablet Take 0.5 mg by mouth nightly as needed       losartan (COZAAR) 50 MG tablet Take 1 tablet (50 mg) by mouth daily       nitroGLYcerin (NITROSTAT) 0.4 MG sublingual tablet Place 0.4 mg under the tongue       omeprazole (PRILOSEC) 20 MG DR capsule  Take 2 capsules (40 mg) by mouth daily as needed       ondansetron (ZOFRAN ODT) 4 MG ODT tab Place 4 mg under the tongue       oxyCODONE (ROXICODONE) 5 MG tablet Take 1 tablet (5 mg) by mouth 2 times daily as needed for pain  0     permethrin (ELIMITE) 5 % external cream APPLY TOPICALLY ONCE . THOROUGHLY MASSAGE CREAM INTO SKIN FROM CHIN TO THE SOLES OF THE FEET. WASH OFF AFTER 8-14 HOURS. REPEAT IN 1 WEEK       polyethylene glycol (MIRALAX) 17 GM/Dose powder Take 17 g by mouth daily as needed for constipation 510 g      prochlorperazine (COMPAZINE) 10 MG tablet Take 10 mg by mouth       sertraline (ZOLOFT) 25 MG tablet Take 1 tablet (25 mg) by mouth daily 90 tablet 3     sevelamer (RENVELA) 2.4 gram PwPk powder [SEVELAMER (RENVELA) 2.4 GRAM PWPK POWDER] MIX AND TAKE 1 PACKET BY   MOUTH TWO TIMES DAILY WITH MEALS       tiotropium (SPIRIVA) 18 MCG inhaled capsule Inhale 1 capsule (18 mcg) into the lungs daily 90 capsule 3     acetaminophen (TYLENOL) 325 MG tablet [ACETAMINOPHEN (TYLENOL) 325 MG TABLET] Take 325-650 mg by mouth every 4 (four) hours as needed.  (Patient not taking: No sig reported)       Dimethicone 1.5 % CREA APPLY TOPICALLY TO AFFECTED AREA DAILY (Patient not taking: No sig reported)         Allergies   Allergen Reactions     Nortriptyline Other (See Comments)     Psychosis  Psychosis       Beta Adrenergic Blockers Hives     Other reaction(s): Wheezing     Amoxicillin      Metoprolol Unknown     Morphine Hives and Nausea     Tolerates Percocet     Codeine Unknown        Social History     Tobacco Use     Smoking status: Current Every Day Smoker     Packs/day: 0.25     Years: 27.00     Pack years: 6.75     Smokeless tobacco: Never Used   Substance Use Topics     Alcohol use: No     Family History   Problem Relation Age of Onset     Hypertension Mother      Kidney Disease Mother      Cancer Brother      Breast Cancer Maternal Aunt      History   Drug Use Unknown     Comment: Has tried multiple  "substances in the past, quit 8 years ago         Objective     BP (!) 150/85 (BP Location: Left arm, Patient Position: Sitting, Cuff Size: Adult Large)   Pulse 51   Ht 1.727 m (5' 8\")   Wt 88.9 kg (196 lb)   SpO2 100%   BMI 29.80 kg/m      Physical Exam  Constitutional:       General: She is not in acute distress.     Appearance: She is not ill-appearing or toxic-appearing.   HENT:      Head: Normocephalic.   Eyes:      Conjunctiva/sclera: Conjunctivae normal.   Cardiovascular:      Rate and Rhythm: Regular rhythm.      Heart sounds: Normal heart sounds. No murmur heard.    No friction rub. No gallop.      Comments: Bilateral scars, no thrills along both arms. Left upper torso HD catheter present  Pulmonary:      Effort: Pulmonary effort is normal. No respiratory distress.      Breath sounds: Normal breath sounds. No wheezing, rhonchi or rales.   Abdominal:      General: Bowel sounds are normal. There is no distension.      Palpations: Abdomen is soft.      Tenderness: There is no abdominal tenderness.   Musculoskeletal:      Right lower leg: No edema.      Left lower leg: No edema.   Neurological:      Mental Status: She is alert.   Psychiatric:         Mood and Affect: Mood normal.         Thought Content: Thought content normal.      Comments: Tangential in thought at times         Recent Labs   Lab Test 09/22/22  1054 06/20/22  1629   HGB 10.4*  --      --      --    POTASSIUM 5.3  --    CR 9.70* 8.8*        Diagnostics:  EKG: sinus bradycardia, rate of 49, no q waves, No ST changes, low QRS,, no LVH by voltage criteria, unchanged from previous tracings. QTc 487.  Independently reviewed and interpreted in clinic.     Revised Cardiac Risk Index (RCRI):  The patient has the following serious cardiovascular risks for perioperative complications:   - Coronary Artery Disease (MI, positive stress test, angina, Qs on EKG) = 1 point   - Serum Creatinine >2.0 mg/dl = 1 point     RCRI Interpretation: 2 " points: Class III (moderate risk - 6.6% complication rate)     Estimated Functional Capacity: METs less than 4           Signed Electronically by: Constantine Ponce MD  Copy of this evaluation report is provided to requesting physician.

## 2022-09-23 NOTE — ASSESSMENT & PLAN NOTE
Patient does use Oxycodone and Gabapentin, constipation likely related to medications.   Continued on miralax as needed.

## 2022-12-26 ENCOUNTER — TELEPHONE (OUTPATIENT)
Dept: ORTHOPEDICS | Facility: CLINIC | Age: 62
End: 2022-12-26

## 2022-12-26 NOTE — TELEPHONE ENCOUNTER
Pt left voicemail requesting a phone call back regarding scheduling her surgery with Dr. Salazar.  Pt states on VM that she is in extreme pain and needs to schedule.     Routed for follow up.     Yun Dyer on 12/26/2022 at 4:52 PM

## 2022-12-27 NOTE — TELEPHONE ENCOUNTER
Phoned patient to get her scheduled for surgery with Dr. Salazar     Patient was unavailable,   Provided call back number in voicemail:   [phone].

## 2022-12-28 NOTE — TELEPHONE ENCOUNTER
Rescheduled   Patient is scheduled for surgery with Dr. Salazar    Spoke with: Rufina    Date of Surgery: 1/31/23    Location: [facility]    Informed patient they will need an adult  Yes    Pre op with Provider PAC    Pre-procedure COVID-19 Test: N/A    Additional imaging/appointments: N/A    Additional comments: N/A

## 2022-12-28 NOTE — TELEPHONE ENCOUNTER
FUTURE VISIT INFORMATION      SURGERY INFORMATION:    Date: 23    Location: [facility] or    Surgeon:  Zac Salazar MD    Anesthesia Type:  general    Procedure: Bilateral femoral head core decompression with bone marrow aspirate concentrate grafting    RECORDS REQUESTED FROM:       Primary Care Provider: Constantine Ponce MD    Pertinent Medical History: hypertension    Most recent EKG+ Tracin22    Most recent ECHO: [facility]

## 2023-01-06 ENCOUNTER — ANESTHESIA EVENT (OUTPATIENT)
Dept: SURGERY | Facility: CLINIC | Age: 63
End: 2023-01-06

## 2023-01-18 ENCOUNTER — PRE VISIT (OUTPATIENT)
Dept: SURGERY | Facility: CLINIC | Age: 63
End: 2023-01-18

## 2023-01-18 ENCOUNTER — ANESTHESIA EVENT (OUTPATIENT)
Dept: SURGERY | Facility: CLINIC | Age: 63
End: 2023-01-18

## 2023-01-31 ENCOUNTER — TELEPHONE (OUTPATIENT)
Dept: ORTHOPEDICS | Facility: CLINIC | Age: 63
End: 2023-01-31
Payer: COMMERCIAL

## 2023-01-31 NOTE — TELEPHONE ENCOUNTER
Received call from patient to reschedule her PAC visit for surgery with Dr Salazar. Patient scheduled for 2/3/23.

## 2023-01-31 NOTE — CONFIDENTIAL NOTE
- A call was placed to the patient. Patient did not answer phone and voicemail box was full so unable to leave a message.

## 2023-02-06 ENCOUNTER — PRE VISIT (OUTPATIENT)
Dept: SURGERY | Facility: CLINIC | Age: 63
End: 2023-02-06

## 2023-02-10 ENCOUNTER — PRE VISIT (OUTPATIENT)
Dept: SURGERY | Facility: CLINIC | Age: 63
End: 2023-02-10

## 2023-03-08 ENCOUNTER — TELEPHONE (OUTPATIENT)
Dept: PEDIATRICS | Facility: CLINIC | Age: 63
End: 2023-03-08

## 2023-03-08 NOTE — TELEPHONE ENCOUNTER
JENNA calling from [facility], requesting verbal ok for PT, OT, Home health aid, and social work.  Pt was recently in hospital and would like to med rec since they set up medications for pt.  Updated med list can be faxed to [phone].  JENNA can be reached at [phone].

## 2023-03-15 ENCOUNTER — DOCUMENTATION ONLY (OUTPATIENT)
Dept: PEDIATRICS | Facility: CLINIC | Age: 63
End: 2023-03-15
Payer: COMMERCIAL

## 2023-03-15 NOTE — PROGRESS NOTES
Type of Form Received: [facility]     Form Received (Date) 3/15/23   Form Filled out No   Placed in provider folder Yes

## 2023-03-17 DIAGNOSIS — Z53.9 DIAGNOSIS NOT YET DEFINED: Primary | ICD-10-CM

## 2023-03-17 PROCEDURE — G0179 MD RECERTIFICATION HHA PT: HCPCS | Performed by: HOSPITALIST

## 2023-03-20 ENCOUNTER — MEDICAL CORRESPONDENCE (OUTPATIENT)
Dept: HEALTH INFORMATION MANAGEMENT | Facility: CLINIC | Age: 63
End: 2023-03-20
Payer: COMMERCIAL

## 2023-03-23 ENCOUNTER — MEDICAL CORRESPONDENCE (OUTPATIENT)
Dept: HEALTH INFORMATION MANAGEMENT | Facility: CLINIC | Age: 63
End: 2023-03-23
Payer: COMMERCIAL

## 2023-03-28 ENCOUNTER — DOCUMENTATION ONLY (OUTPATIENT)
Dept: PEDIATRICS | Facility: CLINIC | Age: 63
End: 2023-03-28
Payer: COMMERCIAL

## 2023-03-28 NOTE — PROGRESS NOTES
Type of Form Received: order    Form Received (Date) 3/28/23   Form Filled out No   Placed in provider folder Yes

## 2023-03-31 ENCOUNTER — MEDICAL CORRESPONDENCE (OUTPATIENT)
Dept: HEALTH INFORMATION MANAGEMENT | Facility: CLINIC | Age: 63
End: 2023-03-31
Payer: COMMERCIAL

## 2023-04-11 NOTE — PROGRESS NOTES
Received Completed forms Yes   Faxed Forms Faxed To: [facility]  Fax Number: [phone]   Sent to HIM (Date) 4/1/23

## 2023-04-12 ENCOUNTER — MEDICAL CORRESPONDENCE (OUTPATIENT)
Dept: HEALTH INFORMATION MANAGEMENT | Facility: CLINIC | Age: 63
End: 2023-04-12

## 2023-04-17 ENCOUNTER — TELEPHONE (OUTPATIENT)
Dept: INTERNAL MEDICINE | Facility: CLINIC | Age: 63
End: 2023-04-17

## 2023-04-17 NOTE — TELEPHONE ENCOUNTER
Abby, Nursing Supervisor with [facility] left message at [facility].    Notifying Dr. Ponce patient is being discharged from services due to multiple missed visits. Have not been able to see patient for any discipline other then  since they opened patient back in early March.    Discharge effective today.    No need for callback, just FYI.    Thank you  Ruben MILLER

## 2025-04-26 ENCOUNTER — LAB REQUISITION (OUTPATIENT)
Dept: LAB | Facility: CLINIC | Age: 65
End: 2025-04-26

## 2025-04-26 LAB — HGB BLD-MCNC: 10.3 G/DL (ref 11.7–15.7)

## 2025-04-26 PROCEDURE — 85018 HEMOGLOBIN: CPT | Performed by: SPECIALIST

## 2025-04-29 DIAGNOSIS — R52 PAIN: Primary | ICD-10-CM

## 2025-04-29 RX ORDER — OXYCODONE HYDROCHLORIDE 5 MG/1
5 TABLET ORAL EVERY 4 HOURS PRN
COMMUNITY
End: 2025-04-29

## 2025-04-29 RX ORDER — OXYCODONE HYDROCHLORIDE 5 MG/1
5-10 TABLET ORAL EVERY 4 HOURS PRN
Qty: 30 TABLET | Refills: 0 | Status: SHIPPED | OUTPATIENT
Start: 2025-04-29

## 2025-04-30 ENCOUNTER — TRANSITIONAL CARE UNIT VISIT (OUTPATIENT)
Dept: GERIATRICS | Facility: CLINIC | Age: 65
End: 2025-04-30
Payer: COMMERCIAL

## 2025-04-30 VITALS
RESPIRATION RATE: 17 BRPM | OXYGEN SATURATION: 93 % | HEART RATE: 68 BPM | DIASTOLIC BLOOD PRESSURE: 66 MMHG | BODY MASS INDEX: 29.8 KG/M2 | TEMPERATURE: 97.2 F | SYSTOLIC BLOOD PRESSURE: 102 MMHG | WEIGHT: 196 LBS

## 2025-04-30 DIAGNOSIS — I10 ESSENTIAL HYPERTENSION: ICD-10-CM

## 2025-04-30 DIAGNOSIS — Z47.89 ORTHOPEDIC AFTERCARE: ICD-10-CM

## 2025-04-30 DIAGNOSIS — N25.81 SECONDARY RENAL HYPERPARATHYROIDISM: ICD-10-CM

## 2025-04-30 DIAGNOSIS — N18.6 END-STAGE RENAL DISEASE ON HEMODIALYSIS (H): ICD-10-CM

## 2025-04-30 DIAGNOSIS — K59.01 SLOW TRANSIT CONSTIPATION: ICD-10-CM

## 2025-04-30 DIAGNOSIS — Z99.2 END-STAGE RENAL DISEASE ON HEMODIALYSIS (H): ICD-10-CM

## 2025-04-30 DIAGNOSIS — Z96.642 S/P TOTAL LEFT HIP ARTHROPLASTY: Primary | ICD-10-CM

## 2025-04-30 DIAGNOSIS — R13.19 ESOPHAGEAL DYSPHAGIA: ICD-10-CM

## 2025-04-30 PROBLEM — J44.1 COPD WITH ACUTE EXACERBATION (H): Status: ACTIVE | Noted: 2024-06-05

## 2025-04-30 PROBLEM — J98.8 VIRAL RESPIRATORY ILLNESS: Status: ACTIVE | Noted: 2024-12-06

## 2025-04-30 PROBLEM — G89.4 CHRONIC PAIN DISORDER: Status: ACTIVE | Noted: 2021-11-19

## 2025-04-30 PROBLEM — R65.10 SIRS (SYSTEMIC INFLAMMATORY RESPONSE SYNDROME) (H): Status: ACTIVE | Noted: 2024-11-08

## 2025-04-30 PROBLEM — R63.4 UNINTENTIONAL WEIGHT LOSS OF 10% BODY WEIGHT WITHIN 6 MONTHS: Status: ACTIVE | Noted: 2023-08-26

## 2025-04-30 PROBLEM — F19.10 POLYSUBSTANCE ABUSE (H): Status: ACTIVE | Noted: 2021-11-19

## 2025-04-30 PROBLEM — F31.81 BIPOLAR II DISORDER (H): Status: ACTIVE | Noted: 2024-04-26

## 2025-04-30 PROBLEM — J44.9 CHRONIC OBSTRUCTIVE PULMONARY DISEASE (H): Status: ACTIVE | Noted: 2024-10-15

## 2025-04-30 PROBLEM — I48.0 PAROXYSMAL ATRIAL FIBRILLATION (H): Status: ACTIVE | Noted: 2024-11-06

## 2025-04-30 PROBLEM — I42.2 HYPERTROPHIC NONOBSTRUCTIVE CARDIOMYOPATHY (H): Status: ACTIVE | Noted: 2023-01-07

## 2025-04-30 PROBLEM — M89.9 CHRONIC KIDNEY DISEASE-MINERAL AND BONE DISORDER (CKD-MBD): Status: ACTIVE | Noted: 2023-08-28

## 2025-04-30 PROBLEM — E05.80 SECONDARY HYPERTHYROIDISM: Status: ACTIVE | Noted: 2023-08-26

## 2025-04-30 PROBLEM — B34.8 RHINOVIRUS: Status: ACTIVE | Noted: 2024-06-12

## 2025-04-30 PROBLEM — B97.89 VIRAL RESPIRATORY ILLNESS: Status: ACTIVE | Noted: 2024-12-06

## 2025-04-30 PROBLEM — I15.0 RENOVASCULAR HYPERTENSION: Status: ACTIVE | Noted: 2021-06-19

## 2025-04-30 PROBLEM — J96.01 ACUTE HYPOXIC RESPIRATORY FAILURE (H): Status: ACTIVE | Noted: 2024-11-07

## 2025-04-30 PROBLEM — F33.1 MAJOR DEPRESSIVE DISORDER, RECURRENT, MODERATE (H): Status: ACTIVE | Noted: 2024-01-25

## 2025-04-30 PROBLEM — R94.31 PROLONGED Q-T INTERVAL ON ECG: Status: ACTIVE | Noted: 2021-06-19

## 2025-04-30 PROBLEM — F14.20 COCAINE USE DISORDER, MODERATE, DEPENDENCE (H): Status: ACTIVE | Noted: 2021-07-09

## 2025-04-30 PROBLEM — M62.81 GENERALIZED MUSCLE WEAKNESS: Status: ACTIVE | Noted: 2024-12-06

## 2025-04-30 PROBLEM — E87.70 HYPERVOLEMIA: Status: ACTIVE | Noted: 2023-08-28

## 2025-04-30 PROBLEM — M70.61 TROCHANTERIC BURSITIS OF BOTH HIPS: Status: ACTIVE | Noted: 2022-02-24

## 2025-04-30 PROBLEM — I50.32 CHRONIC HEART FAILURE WITH PRESERVED EJECTION FRACTION (HFPEF) (H): Status: ACTIVE | Noted: 2024-11-07

## 2025-04-30 PROBLEM — I25.5 ISCHEMIC CARDIOMYOPATHY: Status: ACTIVE | Noted: 2023-08-26

## 2025-04-30 PROBLEM — I31.39 PERICARDIAL EFFUSION: Status: ACTIVE | Noted: 2024-12-04

## 2025-04-30 PROBLEM — J81.1 NONCARDIOGENIC PULMONARY EDEMA: Status: ACTIVE | Noted: 2024-11-07

## 2025-04-30 PROBLEM — F39 EPISODIC MOOD DISORDER: Status: ACTIVE | Noted: 2017-10-07

## 2025-04-30 PROBLEM — E83.9 CHRONIC KIDNEY DISEASE-MINERAL AND BONE DISORDER (CKD-MBD): Status: ACTIVE | Noted: 2023-08-28

## 2025-04-30 PROBLEM — F19.10 PSYCHOACTIVE SUBSTANCE ABUSE (H): Status: ACTIVE | Noted: 2024-01-25

## 2025-04-30 PROBLEM — G95.9 DISEASE OF SPINAL CORD (H): Status: ACTIVE | Noted: 2025-04-18

## 2025-04-30 PROBLEM — J22 LRTI (LOWER RESPIRATORY TRACT INFECTION): Status: ACTIVE | Noted: 2024-12-04

## 2025-04-30 PROBLEM — M25.552 BILATERAL HIP PAIN: Status: ACTIVE | Noted: 2024-11-08

## 2025-04-30 PROBLEM — R50.9 FEVER: Status: ACTIVE | Noted: 2024-11-07

## 2025-04-30 PROBLEM — E66.3 OVERWEIGHT (BMI 25.0-29.9): Status: ACTIVE | Noted: 2024-12-06

## 2025-04-30 PROBLEM — M50.20 PROTRUDED CERVICAL DISC: Status: ACTIVE | Noted: 2017-07-09

## 2025-04-30 PROBLEM — F32.1 CURRENT MODERATE EPISODE OF MAJOR DEPRESSIVE DISORDER (H): Status: ACTIVE | Noted: 2022-09-22

## 2025-04-30 PROBLEM — D84.9 IMMUNOCOMPROMISED STATE: Status: ACTIVE | Noted: 2024-11-06

## 2025-04-30 PROBLEM — R91.1 PULMONARY NODULE: Status: ACTIVE | Noted: 2019-06-22

## 2025-04-30 PROBLEM — J45.909 ASTHMA: Status: ACTIVE | Noted: 2023-08-28

## 2025-04-30 PROBLEM — E83.41 HYPERMAGNESEMIA: Status: ACTIVE | Noted: 2024-01-01

## 2025-04-30 PROBLEM — M47.12 CERVICAL SPONDYLOSIS WITH MYELOPATHY: Status: ACTIVE | Noted: 2017-07-09

## 2025-04-30 PROBLEM — M70.62 TROCHANTERIC BURSITIS OF BOTH HIPS: Status: ACTIVE | Noted: 2022-02-24

## 2025-04-30 PROBLEM — G62.9 POLYNEUROPATHY: Status: ACTIVE | Noted: 2024-11-06

## 2025-04-30 PROBLEM — M87.00 AVASCULAR NECROSIS (H): Status: ACTIVE | Noted: 2021-11-10

## 2025-04-30 PROBLEM — R06.02 SOB (SHORTNESS OF BREATH): Status: ACTIVE | Noted: 2024-01-01

## 2025-04-30 PROBLEM — M25.551 BILATERAL HIP PAIN: Status: ACTIVE | Noted: 2024-11-08

## 2025-04-30 PROBLEM — N18.9 CHRONIC KIDNEY DISEASE-MINERAL AND BONE DISORDER (CKD-MBD): Status: ACTIVE | Noted: 2023-08-28

## 2025-04-30 PROBLEM — I50.22 CHRONIC SYSTOLIC HEART FAILURE (H): Status: ACTIVE | Noted: 2024-06-12

## 2025-04-30 PROBLEM — E78.5 HYPERLIPIDEMIA: Status: ACTIVE | Noted: 2024-12-28

## 2025-04-30 PROBLEM — Z95.5 STENTED CORONARY ARTERY: Status: ACTIVE | Noted: 2023-02-28

## 2025-04-30 PROBLEM — F41.8 DEPRESSION WITH ANXIETY: Status: ACTIVE | Noted: 2024-12-28

## 2025-04-30 PROBLEM — K59.00 CONSTIPATION: Status: ACTIVE | Noted: 2022-09-22

## 2025-04-30 PROCEDURE — 99310 SBSQ NF CARE HIGH MDM 45: CPT

## 2025-04-30 RX ORDER — ASPIRIN 81 MG/1
81 TABLET, CHEWABLE ORAL DAILY
COMMUNITY

## 2025-04-30 RX ORDER — IPRATROPIUM BROMIDE AND ALBUTEROL SULFATE 2.5; .5 MG/3ML; MG/3ML
1 SOLUTION RESPIRATORY (INHALATION) 4 TIMES DAILY PRN
COMMUNITY

## 2025-04-30 RX ORDER — SERTRALINE HYDROCHLORIDE 25 MG/1
25 TABLET, FILM COATED ORAL DAILY
COMMUNITY

## 2025-04-30 RX ORDER — ATORVASTATIN CALCIUM 40 MG/1
40 TABLET, FILM COATED ORAL DAILY
COMMUNITY

## 2025-04-30 RX ORDER — LACTULOSE 10 G/15ML
30 SOLUTION ORAL DAILY PRN
COMMUNITY

## 2025-04-30 RX ORDER — ONDANSETRON 4 MG/1
4 TABLET, ORALLY DISINTEGRATING ORAL EVERY 8 HOURS PRN
COMMUNITY

## 2025-04-30 RX ORDER — LIDOCAINE 4 G/G
1 PATCH TOPICAL EVERY 24 HOURS
COMMUNITY

## 2025-04-30 RX ORDER — GABAPENTIN 300 MG/1
300 CAPSULE ORAL DAILY
COMMUNITY

## 2025-04-30 RX ORDER — CLOPIDOGREL BISULFATE 75 MG/1
75 TABLET ORAL DAILY
COMMUNITY

## 2025-04-30 RX ORDER — LOSARTAN POTASSIUM 100 MG/1
50 TABLET ORAL DAILY
COMMUNITY

## 2025-04-30 RX ORDER — QUETIAPINE FUMARATE 25 MG/1
25 TABLET, FILM COATED ORAL 2 TIMES DAILY
COMMUNITY

## 2025-04-30 RX ORDER — AMOXICILLIN 250 MG
1 CAPSULE ORAL DAILY PRN
COMMUNITY

## 2025-04-30 RX ORDER — ACETAMINOPHEN 500 MG
1000 TABLET ORAL 4 TIMES DAILY
COMMUNITY

## 2025-04-30 RX ORDER — CALCIUM ACETATE 667 MG/1
667 CAPSULE ORAL
COMMUNITY

## 2025-04-30 RX ORDER — QUETIAPINE FUMARATE 100 MG/1
100 TABLET, FILM COATED ORAL AT BEDTIME
COMMUNITY

## 2025-04-30 RX ORDER — FLUTICASONE PROPIONATE AND SALMETEROL XINAFOATE 115; 21 UG/1; UG/1
2 AEROSOL, METERED RESPIRATORY (INHALATION) 2 TIMES DAILY
COMMUNITY

## 2025-04-30 RX ORDER — CARVEDILOL 12.5 MG/1
12.5 TABLET ORAL 2 TIMES DAILY WITH MEALS
COMMUNITY

## 2025-04-30 RX ORDER — VITAMIN B COMPLEX
1 TABLET ORAL DAILY
COMMUNITY

## 2025-04-30 RX ORDER — ASPIRIN 81 MG
100 TABLET, DELAYED RELEASE (ENTERIC COATED) ORAL DAILY
COMMUNITY

## 2025-04-30 NOTE — LETTER
" 4/30/2025      Rufina Urrutia  [address]  [place] [address]        [facility]    PRIMARY CARE PROVIDER AND CLINIC:  Constantine Ponce MD, [facility]- 4th floor / [facility]  Chief Complaint   Patient presents with     Hospital F/U      [place] Medical Record Number:  [id number]  Place of Service where encounter took place:  [facility] ([facility]) [[id number]]    Rufina Urrutia  is a 64 year old  (1960), admitted to the above facility from  [facility]. Hospital stay 4/21/25 through 4/24/25..     HPI:    Brief Hospital Stay Summary   The patient was admitted for a total left arthroplasty of the left hip due to persistent pain and failed non-operative management. No complications during hospitalization. Hemoglobin stable at discharge.     Today she reports pain is well controlled. She is using the oxycodone occasionally. She does report constipation, last bowel movement 3 days ago. States she took senna, miralax and lactulose this morning. States she often has a hard time having a bowel movement after she has dialysis due to being \"so dry.\" She is sleeping well. Appetite stable. Mood stable. Denies urinary symptoms including dysuria or hematuria. Denies shortness of breath, chest pain, palpitations, dizziness, lightheadedness.     CODE STATUS/ADVANCE DIRECTIVES DISCUSSION:  No Order  CPR/Full code   ALLERGIES:   Allergies   Allergen Reactions     Nortriptyline Other (See Comments)     Psychosis  Psychosis       Beta Adrenergic Blockers Hives     Other reaction(s): Wheezing     Amoxicillin      Metoprolol Unknown     Morphine Hives and Nausea     Tolerates Percocet     Codeine Unknown      PAST MEDICAL HISTORY:   Past Medical History:   Diagnosis Date     Anemia      COPD (chronic obstructive pulmonary disease) (H)      Coronary artery disease involving native coronary artery     No history of stent     ESRD (end stage renal disease) on " dialysis (H)      Mild intermittent asthma      Obesity      Osteonecrosis (H)     unclear ideology     Primary hypertension       PAST SURGICAL HISTORY:   has a past surgical history that includes av fistula or graft arterial; IR Dialysis Fistulogram Right (2/15/2021); IR Dialysis Fistulogram Right (12/14/2020); IR Dialysis Fistulogram Right (10/30/2020); IR Upper Extremity Venogram Right (6/3/2020); IR CVC Tunnel W2 Cath w/o Port (4/23/2019); IR Dialysis Fistulogram Right (4/23/2019); IR Dialysis Fistulogram Right (2/5/2018); IR Dialysis Fistulogram Right (10/5/2017); IR Dialysis Fistulogram Right (9/18/2015); IR Dialysis Fistulogram Right (7/1/2015); IR Dialysis Fistulogram Right (7/10/2014); IR Dialysis Fistulogram Right (11/17/2013); IR CVC Tunnel W2 Cath w/o Port (9/12/2013); IR Dialysis Fistulogram Right (9/12/2013); and IR Dialysis Fistulogram Right (9/4/2013).  FAMILY HISTORY: family history includes Breast Cancer in her maternal aunt; Cancer in her brother; Hypertension in her mother; Kidney Disease in her mother.  SOCIAL HISTORY:   reports that she has been smoking. She has a 6.8 pack-year smoking history. She has never used smokeless tobacco. She reports that she does not currently use drugs. She reports that she does not drink alcohol.    Current Outpatient Medications   Medication Sig Dispense Refill     acetaminophen (TYLENOL) 500 MG tablet Take 1,000 mg by mouth 4 times daily.       albuterol (PROAIR HFA/PROVENTIL HFA/VENTOLIN HFA) 108 (90 Base) MCG/ACT inhaler Inhale 2 puffs into the lungs every 6 hours as needed for shortness of breath, wheezing or cough       aspirin (ASA) 81 MG chewable tablet Take 81 mg by mouth daily.       atorvastatin (LIPITOR) 40 MG tablet Take 40 mg by mouth daily.       B complex with C#20-folic acid (NEPHROCAPS) 1 mg cap capsule [B COMPLEX WITH C#20-FOLIC ACID (NEPHROCAPS) 1 MG CAP CAPSULE] TAKE 1 BY MOUTH DAILY       calcium acetate (PHOSLO) 667 MG CAPS capsule Take 667  mg by mouth 3 times daily (with meals).       carvedilol (COREG) 12.5 MG tablet Take 12.5 mg by mouth 2 times daily (with meals).       clopidogrel (PLAVIX) 75 MG tablet Take 75 mg by mouth daily.       docusate sodium (COLACE) 100 MG tablet Take 100 mg by mouth daily.       fluticasone (FLOVENT DISKUS) 50 MCG/ACT inhaler Inhale 1 puff into the lungs daily.       fluticasone-salmeterol (ADVAIR HFA) 115-21 MCG/ACT inhaler Inhale 2 puffs into the lungs 2 times daily.       gabapentin (NEURONTIN) 300 MG capsule Take 300 mg by mouth daily.       ipratropium - albuterol 0.5 mg/2.5 mg/3 mL (DUONEB) 0.5-2.5 (3) MG/3ML neb solution Take 1 vial by nebulization 4 times daily as needed for shortness of breath, wheezing or cough.       lactulose (CHRONULAC) 10 GM/15ML solution Take 30 g by mouth daily as needed for constipation.       Lidocaine (LIDOCARE) 4 % Patch Place 1 patch onto the skin every 24 hours. To prevent lidocaine toxicity, patient should be patch free for 12 hrs daily.       losartan (COZAAR) 100 MG tablet Take 50 mg by mouth daily.       nitroGLYcerin (NITROSTAT) 0.4 MG sublingual tablet Place 0.4 mg under the tongue.       omeprazole (PRILOSEC) 20 MG DR capsule Take 20 mg by mouth daily as needed       ondansetron (ZOFRAN ODT) 4 MG ODT tab Take 4 mg by mouth every 8 hours as needed for nausea.       oxyCODONE (ROXICODONE) 5 MG tablet Take 1-2 tablets (5-10 mg) by mouth every 4 hours as needed for severe pain. 30 tablet 0     polyethylene glycol (MIRALAX) 17 GM/Dose powder Take 17 g by mouth daily as needed for constipation 510 g      QUEtiapine (SEROQUEL) 100 MG tablet Take 100 mg by mouth at bedtime.       QUEtiapine (SEROQUEL) 25 MG tablet Take 25 mg by mouth 2 times daily.       senna-docusate (SENOKOT-S/PERICOLACE) 8.6-50 MG tablet Take 1 tablet by mouth daily as needed for constipation.       sertraline (ZOLOFT) 25 MG tablet Take 25 mg by mouth daily.       Sodium Zirconium Cyclosilicate (LOKELMA PO) Take  by mouth three times a week.       umeclidinium (INCRUSE ELLIPTA) 62.5 MCG/ACT inhaler Inhale 1 puff into the lungs daily as needed.       Vitamin D3 (CHOLECALCIFEROL) 25 mcg (1000 units) tablet Take 1 tablet by mouth daily.       No current facility-administered medications for this visit.      ROS:  4 point ROS including Respiratory, CV, GI and , other than that noted in the HPI,  is negative    Vital signs:/66   Pulse 68   Temp 97.2  F (36.2  C)   Resp 17   Wt 88.9 kg (196 lb)   SpO2 93%   BMI 29.80 kg/m     GENERAL APPEARANCE: Well developed, well nourished, in no acute distress. Dialysis port in left chest- clean dry and intact  LUNGS: Lung sounds CTA, no adventitious sounds, respiratory effort normal.  CARD: RRR, S1, S2, without murmurs, gallops, rubs, no JVD, peripheral pulses 2+ and symmetric  ABD: Soft, nondistended and nontender with normal bowel sounds.   MSK: Muscle strength and tone were equal bilaterally. Moves all extremities easily and intentionally.   EXTREMITIES: No cyanosis, clubbing or edema.  NEURO: Alert and oriented x 3. Normal affect. Sensation to touch was normal. Face is symmetric.  PSYCH: memory and judgement impaired at baseline     Lab/Diagnostic data:  Labs reviewed in EPIC by me including BMP, hemoglobin, creat, sodium, potassium, magnesium    ASSESSMENT/PLAN:  Total left hip arthroplasty  Orthopedic aftercare  Tylenol and oxycodone for pain management (wean as able)  Plavix for DVT prophylaxis  WBAT  Follow up 5/2/25 with orthopedics  PT/OT  CBC/BMP due 5/2/25    End stage renal disease on dialysis  Secondary renal hyperparathyroidism  Followed by DR Larios at   [facility] (lat full run was yesterday)  Continue calcium acetate as per outpt management    HTN  Bps acceptable   Continue PTA losartan and coreg    Esophageal dysphagia  Continue PTA PPI  Avoid trigger foods    Constipation  Continue PTA Lactulose and stool softeners     Orders:  CBC/BMP due 5/2/25    >45  minutes spent assessing patient, providing education, reviewing records from recent hospitalization at [facility], collaborating with nursing, developing plan of care.     Electronically signed by:  NICOLASA Norton CNP                    Sincerely,        NICOLASA Norton CNP    Electronically signed

## 2025-04-30 NOTE — PROGRESS NOTES
"[facility] GERIATRICS    PRIMARY CARE PROVIDER AND CLINIC:  Constantine Ponce MD, [address] [facility]- 4th floor / [place]  Chief Complaint   Patient presents with    Hospital F/U      [place] Medical Record Number:  [id number]  Place of Service where encounter took place:  [facility] ([facility]) [[id number]]    Rufina Urrutia  is a 64 year old  (1960), admitted to the above facility from  [facility]. Hospital stay 4/21/25 through 4/24/25..     HPI:    Brief Hospital Stay Summary   The patient was admitted for a total left arthroplasty of the left hip due to persistent pain and failed non-operative management. No complications during hospitalization. Hemoglobin stable at discharge.     Today she reports pain is well controlled. She is using the oxycodone occasionally. She does report constipation, last bowel movement 3 days ago. States she took senna, miralax and lactulose this morning. States she often has a hard time having a bowel movement after she has dialysis due to being \"so dry.\" She is sleeping well. Appetite stable. Mood stable. Denies urinary symptoms including dysuria or hematuria. Denies shortness of breath, chest pain, palpitations, dizziness, lightheadedness.     CODE STATUS/ADVANCE DIRECTIVES DISCUSSION:  No Order  CPR/Full code   ALLERGIES:   Allergies   Allergen Reactions    Nortriptyline Other (See Comments)     Psychosis  Psychosis      Beta Adrenergic Blockers Hives     Other reaction(s): Wheezing    Amoxicillin     Metoprolol Unknown    Morphine Hives and Nausea     Tolerates Percocet    Codeine Unknown      PAST MEDICAL HISTORY:   Past Medical History:   Diagnosis Date    Anemia     COPD (chronic obstructive pulmonary disease) (H)     Coronary artery disease involving native coronary artery     No history of stent    ESRD (end stage renal disease) on dialysis (H)     Mild intermittent asthma     Obesity     Osteonecrosis (H)     unclear ideology    " Primary hypertension       PAST SURGICAL HISTORY:   has a past surgical history that includes av fistula or graft arterial; IR Dialysis Fistulogram Right (2/15/2021); IR Dialysis Fistulogram Right (12/14/2020); IR Dialysis Fistulogram Right (10/30/2020); IR Upper Extremity Venogram Right (6/3/2020); IR CVC Tunnel W2 Cath w/o Port (4/23/2019); IR Dialysis Fistulogram Right (4/23/2019); IR Dialysis Fistulogram Right (2/5/2018); IR Dialysis Fistulogram Right (10/5/2017); IR Dialysis Fistulogram Right (9/18/2015); IR Dialysis Fistulogram Right (7/1/2015); IR Dialysis Fistulogram Right (7/10/2014); IR Dialysis Fistulogram Right (11/17/2013); IR CVC Tunnel W2 Cath w/o Port (9/12/2013); IR Dialysis Fistulogram Right (9/12/2013); and IR Dialysis Fistulogram Right (9/4/2013).  FAMILY HISTORY: family history includes Breast Cancer in her maternal aunt; Cancer in her brother; Hypertension in her mother; Kidney Disease in her mother.  SOCIAL HISTORY:   reports that she has been smoking. She has a 6.8 pack-year smoking history. She has never used smokeless tobacco. She reports that she does not currently use drugs. She reports that she does not drink alcohol.    Current Outpatient Medications   Medication Sig Dispense Refill    acetaminophen (TYLENOL) 500 MG tablet Take 1,000 mg by mouth 4 times daily.      albuterol (PROAIR HFA/PROVENTIL HFA/VENTOLIN HFA) 108 (90 Base) MCG/ACT inhaler Inhale 2 puffs into the lungs every 6 hours as needed for shortness of breath, wheezing or cough      aspirin (ASA) 81 MG chewable tablet Take 81 mg by mouth daily.      atorvastatin (LIPITOR) 40 MG tablet Take 40 mg by mouth daily.      B complex with C#20-folic acid (NEPHROCAPS) 1 mg cap capsule [B COMPLEX WITH C#20-FOLIC ACID (NEPHROCAPS) 1 MG CAP CAPSULE] TAKE 1 BY MOUTH DAILY      calcium acetate (PHOSLO) 667 MG CAPS capsule Take 667 mg by mouth 3 times daily (with meals).      carvedilol (COREG) 12.5 MG tablet Take 12.5 mg by mouth 2 times  daily (with meals).      clopidogrel (PLAVIX) 75 MG tablet Take 75 mg by mouth daily.      docusate sodium (COLACE) 100 MG tablet Take 100 mg by mouth daily.      fluticasone (FLOVENT DISKUS) 50 MCG/ACT inhaler Inhale 1 puff into the lungs daily.      fluticasone-salmeterol (ADVAIR HFA) 115-21 MCG/ACT inhaler Inhale 2 puffs into the lungs 2 times daily.      gabapentin (NEURONTIN) 300 MG capsule Take 300 mg by mouth daily.      ipratropium - albuterol 0.5 mg/2.5 mg/3 mL (DUONEB) 0.5-2.5 (3) MG/3ML neb solution Take 1 vial by nebulization 4 times daily as needed for shortness of breath, wheezing or cough.      lactulose (CHRONULAC) 10 GM/15ML solution Take 30 g by mouth daily as needed for constipation.      Lidocaine (LIDOCARE) 4 % Patch Place 1 patch onto the skin every 24 hours. To prevent lidocaine toxicity, patient should be patch free for 12 hrs daily.      losartan (COZAAR) 100 MG tablet Take 50 mg by mouth daily.      nitroGLYcerin (NITROSTAT) 0.4 MG sublingual tablet Place 0.4 mg under the tongue.      omeprazole (PRILOSEC) 20 MG DR capsule Take 20 mg by mouth daily as needed      ondansetron (ZOFRAN ODT) 4 MG ODT tab Take 4 mg by mouth every 8 hours as needed for nausea.      oxyCODONE (ROXICODONE) 5 MG tablet Take 1-2 tablets (5-10 mg) by mouth every 4 hours as needed for severe pain. 30 tablet 0    polyethylene glycol (MIRALAX) 17 GM/Dose powder Take 17 g by mouth daily as needed for constipation 510 g     QUEtiapine (SEROQUEL) 100 MG tablet Take 100 mg by mouth at bedtime.      QUEtiapine (SEROQUEL) 25 MG tablet Take 25 mg by mouth 2 times daily.      senna-docusate (SENOKOT-S/PERICOLACE) 8.6-50 MG tablet Take 1 tablet by mouth daily as needed for constipation.      sertraline (ZOLOFT) 25 MG tablet Take 25 mg by mouth daily.      Sodium Zirconium Cyclosilicate (LOKELMA PO) Take by mouth three times a week.      umeclidinium (INCRUSE ELLIPTA) 62.5 MCG/ACT inhaler Inhale 1 puff into the lungs daily as  needed.      Vitamin D3 (CHOLECALCIFEROL) 25 mcg (1000 units) tablet Take 1 tablet by mouth daily.       No current facility-administered medications for this visit.      ROS:  4 point ROS including Respiratory, CV, GI and , other than that noted in the HPI,  is negative    Vital signs:/66   Pulse 68   Temp 97.2  F (36.2  C)   Resp 17   Wt 88.9 kg (196 lb)   SpO2 93%   BMI 29.80 kg/m     GENERAL APPEARANCE: Well developed, well nourished, in no acute distress. Dialysis port in left chest- clean dry and intact  LUNGS: Lung sounds CTA, no adventitious sounds, respiratory effort normal.  CARD: RRR, S1, S2, without murmurs, gallops, rubs, no JVD, peripheral pulses 2+ and symmetric  ABD: Soft, nondistended and nontender with normal bowel sounds.   MSK: Muscle strength and tone were equal bilaterally. Moves all extremities easily and intentionally.   EXTREMITIES: No cyanosis, clubbing or edema.  NEURO: Alert and oriented x 3. Normal affect. Sensation to touch was normal. Face is symmetric.  PSYCH: memory and judgement impaired at baseline     Lab/Diagnostic data:  Labs reviewed in EPIC by me including BMP, hemoglobin, creat, sodium, potassium, magnesium    ASSESSMENT/PLAN:  Total left hip arthroplasty  Orthopedic aftercare  Tylenol and oxycodone for pain management (wean as able)  Plavix for DVT prophylaxis  WBAT  Follow up 5/2/25 with orthopedics  PT/OT  CBC/BMP due 5/2/25    End stage renal disease on dialysis  Secondary renal hyperparathyroidism  Followed by DR Larios at   [facility] (lat full run was yesterday)  Continue calcium acetate as per outpt management    HTN  Bps acceptable   Continue PTA losartan and coreg    Esophageal dysphagia  Continue PTA PPI  Avoid trigger foods    Constipation  Continue PTA Lactulose and stool softeners     Orders:  CBC/BMP due 5/2/25    >45 minutes spent assessing patient, providing education, reviewing records from recent hospitalization at [facility], collaborating  with nursing, developing plan of care.     Electronically signed by:  NICOLASA Norton CNP

## 2025-05-01 DIAGNOSIS — Z09 HOSPITAL DISCHARGE FOLLOW-UP: ICD-10-CM
